# Patient Record
Sex: MALE | Race: BLACK OR AFRICAN AMERICAN | NOT HISPANIC OR LATINO | Employment: OTHER | URBAN - METROPOLITAN AREA
[De-identification: names, ages, dates, MRNs, and addresses within clinical notes are randomized per-mention and may not be internally consistent; named-entity substitution may affect disease eponyms.]

---

## 2020-03-24 ENCOUNTER — OFFICE VISIT (OUTPATIENT)
Dept: URGENT CARE | Facility: CLINIC | Age: 40
End: 2020-03-24
Payer: COMMERCIAL

## 2020-03-24 VITALS
OXYGEN SATURATION: 98 % | HEIGHT: 70 IN | BODY MASS INDEX: 25.91 KG/M2 | WEIGHT: 181 LBS | SYSTOLIC BLOOD PRESSURE: 124 MMHG | DIASTOLIC BLOOD PRESSURE: 74 MMHG | HEART RATE: 70 BPM | TEMPERATURE: 99 F | RESPIRATION RATE: 20 BRPM

## 2020-03-24 DIAGNOSIS — B34.9 ACUTE VIRAL SYNDROME: Primary | ICD-10-CM

## 2020-03-24 PROCEDURE — 99214 PR OFFICE/OUTPT VISIT, EST, LEVL IV, 30-39 MIN: ICD-10-PCS | Mod: S$GLB,,, | Performed by: EMERGENCY MEDICINE

## 2020-03-24 PROCEDURE — 99214 OFFICE O/P EST MOD 30 MIN: CPT | Mod: S$GLB,,, | Performed by: EMERGENCY MEDICINE

## 2020-03-24 RX ORDER — AZITHROMYCIN 250 MG/1
TABLET, FILM COATED ORAL
Status: ON HOLD | COMMUNITY
Start: 2020-03-09 | End: 2020-03-29 | Stop reason: HOSPADM

## 2020-03-24 NOTE — PATIENT INSTRUCTIONS
At this time you do not meet criteria for coronavirus testing.  This does not mean that you dont have coronavirus.  Is simply means that we are unable to test you at this time, and we recommend that you do not have contact with immune compromised persons, pregnant women,  or people in the extremes of age.  Please use good handwashing practices.  At this time you should self-quarantine at home for a minimum of 7 days and an additional 24-48 hours after fever and symptoms have resolved.    Over the Counter Medications for upper respiratory infection and viral syndrome:    1. Ibuprofen 800 mg every 8 hours. May alternate with tylenol 1000 mg every 4 hours. (Do not take tylenol with other sources of acetaminophen such as Dayquil)  2. Zyrtec or Claritin 10 mg daily  3. Delsym or Dayquil for cough and congestion  4. Flonase for congestion and sinus pressure    Please call your primary care doctor or telemedicine if your symptoms worsen.  You may need to speak to your physician regarding whether you require further evaluation in the emergency room.

## 2020-03-24 NOTE — PROGRESS NOTES
"[facility] - Visit Note                                           Chief Complaint  39 y.o. male with URI    History of Present Illness  Zachary Riley Jr. presents to the urgent care with complaints of headaches, back pain, fever.  Patient reports that he took Tylenol 2 tablets about 2 hr ago.  He has no significant past medical history.  He owns a car lot.  Patient denies all risk qualify wrist.  This visit was conducted remotely per [facility] protocol    History reviewed. No pertinent past medical history.  History reviewed. No pertinent surgical history.   Review of patient's allergies indicates:  No Known Allergies     Review of Systems and Physical Exam     Review of Systems  -- Constitution - reports fever, no weight loss, no loss of consciousness  -- Eyes - no changes in vision, no redness, no swelling, no discharge  -- Ear, Nose - no  earache, no loss of hearing, no epistaxis  -- Mouth,Throat - no sore throat, no toothache, normal voice, normal swallowing  -- Respiratory - denies cough and congestion, no shortness of breath, no wheezing, no increased WOB   -- Cardiovascular - denies chest pain, no palpitations, no lower extremity edema  -- Gastrointestinal - denies abdominal pain, denies nausea, vomiting, and diarrhea  -- Genitourinary - no dysuria, denies flank pain, no hematuria or frequency   -- Musculoskeletal - denies back pain, negative for myalgias and arthralgias   -- Neurological - no headache, no neurologic changes, no loss of bladder or bowel function no seizure like activity, no changes in hearing or vision  -- Skin - denies skin changes, no rash, no hives, no suspected skin infection    Vital Signs   height is 5' 10" (1.778 m) and weight is 82.1 kg (181 lb). His temperature is 98.7 °F (37.1 °C). His blood pressure is 124/74 and his pulse is 70. His respiration is 20 and oxygen saturation is 98%.      Physical Exam non complete     Treatment Course, Evaluation, and Medical " Decision Makin.  Physical exam not completed  2.  Patient does not meet criteria for corona virus testing      Diagnosis  -- acute viral syndrome  Disposition and Plan  -- Disposition: home  -- Condition: stable  -- Follow-up: Patient to follow up with Primary Doctor No in 1-2 days, and any specialists noted on discharge paperwork  -- I advised the patient that we have found no life threatening condition today and have provided recommendations his/her care  -- At this time, I believe the patient is clinically stable for discharge.   -- The patient acknowledges that ongoing follow up with a MD is required   -- Patient agrees to comply with all instruction and direction given in the urgent care  -- Patient counseled on strict return precautions as discussed

## 2020-03-27 ENCOUNTER — HOSPITAL ENCOUNTER (OUTPATIENT)
Facility: HOSPITAL | Age: 40
Discharge: HOME | End: 2020-03-29
Attending: EMERGENCY MEDICINE | Admitting: INTERNAL MEDICINE
Payer: COMMERCIAL

## 2020-03-27 DIAGNOSIS — Z20.822 SUSPECTED COVID-19 VIRUS INFECTION: ICD-10-CM

## 2020-03-27 DIAGNOSIS — R50.9 FEVER: ICD-10-CM

## 2020-03-27 DIAGNOSIS — J18.9 PNEUMONIA OF BOTH LOWER LOBES DUE TO INFECTIOUS ORGANISM: ICD-10-CM

## 2020-03-27 DIAGNOSIS — U07.1 COVID-19 VIRUS DETECTED: Primary | ICD-10-CM

## 2020-03-27 LAB
ALBUMIN SERPL BCP-MCNC: 4 G/DL (ref 3.5–5.2)
ALP SERPL-CCNC: 38 U/L (ref 55–135)
ALT SERPL W/O P-5'-P-CCNC: 22 U/L (ref 10–44)
ANION GAP SERPL CALC-SCNC: 14 MMOL/L (ref 8–16)
AST SERPL-CCNC: 29 U/L (ref 10–40)
BASOPHILS # BLD AUTO: 0.01 K/UL (ref 0–0.2)
BASOPHILS NFR BLD: 0.1 % (ref 0–1.9)
BILIRUB SERPL-MCNC: 0.7 MG/DL (ref 0.1–1)
BNP SERPL-MCNC: 19 PG/ML (ref 0–99)
BUN SERPL-MCNC: 16 MG/DL (ref 6–20)
CALCIUM SERPL-MCNC: 8.6 MG/DL (ref 8.7–10.5)
CHLORIDE SERPL-SCNC: 97 MMOL/L (ref 95–110)
CO2 SERPL-SCNC: 26 MMOL/L (ref 23–29)
CREAT SERPL-MCNC: 1.3 MG/DL (ref 0.5–1.4)
CRP SERPL-MCNC: 10.93 MG/DL (ref 0–0.75)
DEPRECATED S PYO AG THROAT QL EIA: NEGATIVE
DIFFERENTIAL METHOD: NORMAL
EOSINOPHIL # BLD AUTO: 0 K/UL (ref 0–0.5)
EOSINOPHIL NFR BLD: 0 % (ref 0–8)
ERYTHROCYTE [DISTWIDTH] IN BLOOD BY AUTOMATED COUNT: 12 % (ref 11.5–14.5)
EST. GFR  (AFRICAN AMERICAN): >60 ML/MIN/1.73 M^2
EST. GFR  (NON AFRICAN AMERICAN): >60 ML/MIN/1.73 M^2
FERRITIN SERPL-MCNC: 516 NG/ML (ref 20–300)
GLUCOSE SERPL-MCNC: 116 MG/DL (ref 70–110)
HCT VFR BLD AUTO: 43.3 % (ref 40–54)
HGB BLD-MCNC: 14.2 G/DL (ref 14–18)
IMM GRANULOCYTES # BLD AUTO: 0.03 K/UL (ref 0–0.04)
IMM GRANULOCYTES NFR BLD AUTO: 0.3 % (ref 0–0.5)
INFLUENZA A, MOLECULAR: NEGATIVE
INFLUENZA B, MOLECULAR: NEGATIVE
LACTATE SERPL-SCNC: 1.5 MMOL/L (ref 0.5–1.9)
LDH SERPL L TO P-CCNC: 233 U/L (ref 110–260)
LYMPHOCYTES # BLD AUTO: 3.2 K/UL (ref 1–4.8)
LYMPHOCYTES NFR BLD: 35.2 % (ref 18–48)
MCH RBC QN AUTO: 29.5 PG (ref 27–31)
MCHC RBC AUTO-ENTMCNC: 32.8 G/DL (ref 32–36)
MCV RBC AUTO: 90 FL (ref 82–98)
MONOCYTES # BLD AUTO: 0.4 K/UL (ref 0.3–1)
MONOCYTES NFR BLD: 4.6 % (ref 4–15)
NEUTROPHILS # BLD AUTO: 5.5 K/UL (ref 1.8–7.7)
NEUTROPHILS NFR BLD: 59.8 % (ref 38–73)
NRBC BLD-RTO: 0 /100 WBC
PLATELET # BLD AUTO: 162 K/UL (ref 150–350)
PMV BLD AUTO: 9.9 FL (ref 9.2–12.9)
POTASSIUM SERPL-SCNC: 3.6 MMOL/L (ref 3.5–5.1)
PROCALCITONIN SERPL IA-MCNC: 0.66 NG/ML (ref 0–0.5)
PROT SERPL-MCNC: 7.7 G/DL (ref 6–8.4)
RBC # BLD AUTO: 4.82 M/UL (ref 4.6–6.2)
SODIUM SERPL-SCNC: 137 MMOL/L (ref 136–145)
SPECIMEN SOURCE: NORMAL
TROPONIN I SERPL DL<=0.01 NG/ML-MCNC: <0.03 NG/ML
WBC # BLD AUTO: 9.18 K/UL (ref 3.9–12.7)

## 2020-03-27 PROCEDURE — 94640 AIRWAY INHALATION TREATMENT: CPT

## 2020-03-27 PROCEDURE — 25000003 PHARM REV CODE 250: Performed by: EMERGENCY MEDICINE

## 2020-03-27 PROCEDURE — 25000242 PHARM REV CODE 250 ALT 637 W/ HCPCS: Performed by: EMERGENCY MEDICINE

## 2020-03-27 PROCEDURE — 99285 EMERGENCY DEPT VISIT HI MDM: CPT | Mod: 25

## 2020-03-27 PROCEDURE — 86140 C-REACTIVE PROTEIN: CPT

## 2020-03-27 PROCEDURE — 82728 ASSAY OF FERRITIN: CPT

## 2020-03-27 PROCEDURE — 87205 SMEAR GRAM STAIN: CPT

## 2020-03-27 PROCEDURE — 25000003 PHARM REV CODE 250: Performed by: INTERNAL MEDICINE

## 2020-03-27 PROCEDURE — 87081 CULTURE SCREEN ONLY: CPT

## 2020-03-27 PROCEDURE — U0002 COVID-19 LAB TEST NON-CDC: HCPCS

## 2020-03-27 PROCEDURE — 63600175 PHARM REV CODE 636 W HCPCS: Performed by: INTERNAL MEDICINE

## 2020-03-27 PROCEDURE — 96376 TX/PRO/DX INJ SAME DRUG ADON: CPT

## 2020-03-27 PROCEDURE — 87880 STREP A ASSAY W/OPTIC: CPT

## 2020-03-27 PROCEDURE — 84145 PROCALCITONIN (PCT): CPT

## 2020-03-27 PROCEDURE — 84484 ASSAY OF TROPONIN QUANT: CPT

## 2020-03-27 PROCEDURE — G0378 HOSPITAL OBSERVATION PER HR: HCPCS

## 2020-03-27 PROCEDURE — 83605 ASSAY OF LACTIC ACID: CPT

## 2020-03-27 PROCEDURE — 87502 INFLUENZA DNA AMP PROBE: CPT

## 2020-03-27 PROCEDURE — 80053 COMPREHEN METABOLIC PANEL: CPT

## 2020-03-27 PROCEDURE — 85025 COMPLETE CBC W/AUTO DIFF WBC: CPT

## 2020-03-27 PROCEDURE — 83880 ASSAY OF NATRIURETIC PEPTIDE: CPT

## 2020-03-27 PROCEDURE — 96375 TX/PRO/DX INJ NEW DRUG ADDON: CPT

## 2020-03-27 PROCEDURE — 87040 BLOOD CULTURE FOR BACTERIA: CPT | Mod: 59

## 2020-03-27 PROCEDURE — 63600175 PHARM REV CODE 636 W HCPCS: Performed by: EMERGENCY MEDICINE

## 2020-03-27 PROCEDURE — 83615 LACTATE (LD) (LDH) ENZYME: CPT

## 2020-03-27 PROCEDURE — 36415 COLL VENOUS BLD VENIPUNCTURE: CPT

## 2020-03-27 PROCEDURE — 87070 CULTURE OTHR SPECIMN AEROBIC: CPT | Mod: 59

## 2020-03-27 PROCEDURE — 94761 N-INVAS EAR/PLS OXIMETRY MLT: CPT

## 2020-03-27 PROCEDURE — 96365 THER/PROPH/DIAG IV INF INIT: CPT

## 2020-03-27 RX ORDER — SODIUM CHLORIDE 0.9 % (FLUSH) 0.9 %
10 SYRINGE (ML) INJECTION
Status: DISCONTINUED | OUTPATIENT
Start: 2020-03-27 | End: 2020-03-29 | Stop reason: HOSPADM

## 2020-03-27 RX ORDER — AZITHROMYCIN 250 MG/1
500 TABLET, FILM COATED ORAL
Status: DISCONTINUED | OUTPATIENT
Start: 2020-03-28 | End: 2020-03-29 | Stop reason: HOSPADM

## 2020-03-27 RX ORDER — ALBUTEROL SULFATE 90 UG/1
2 AEROSOL, METERED RESPIRATORY (INHALATION) EVERY 8 HOURS
Status: DISCONTINUED | OUTPATIENT
Start: 2020-03-27 | End: 2020-03-27

## 2020-03-27 RX ORDER — ACETAMINOPHEN 500 MG
1000 TABLET ORAL
Status: COMPLETED | OUTPATIENT
Start: 2020-03-27 | End: 2020-03-27

## 2020-03-27 RX ORDER — POLYETHYLENE GLYCOL 3350 17 G/17G
17 POWDER, FOR SOLUTION ORAL 2 TIMES DAILY PRN
Status: DISCONTINUED | OUTPATIENT
Start: 2020-03-27 | End: 2020-03-29 | Stop reason: HOSPADM

## 2020-03-27 RX ORDER — ALBUTEROL SULFATE 90 UG/1
2 AEROSOL, METERED RESPIRATORY (INHALATION) EVERY 8 HOURS
Status: DISCONTINUED | OUTPATIENT
Start: 2020-03-28 | End: 2020-03-28

## 2020-03-27 RX ORDER — ONDANSETRON 2 MG/ML
4 INJECTION INTRAMUSCULAR; INTRAVENOUS EVERY 6 HOURS PRN
Status: DISCONTINUED | OUTPATIENT
Start: 2020-03-27 | End: 2020-03-29 | Stop reason: HOSPADM

## 2020-03-27 RX ORDER — IBUPROFEN 200 MG
24 TABLET ORAL
Status: DISCONTINUED | OUTPATIENT
Start: 2020-03-27 | End: 2020-03-29 | Stop reason: HOSPADM

## 2020-03-27 RX ORDER — BENZONATATE 100 MG/1
200 CAPSULE ORAL 3 TIMES DAILY PRN
Status: DISCONTINUED | OUTPATIENT
Start: 2020-03-27 | End: 2020-03-29 | Stop reason: HOSPADM

## 2020-03-27 RX ORDER — GLUCAGON 1 MG
1 KIT INJECTION
Status: DISCONTINUED | OUTPATIENT
Start: 2020-03-27 | End: 2020-03-29 | Stop reason: HOSPADM

## 2020-03-27 RX ORDER — TALC
6 POWDER (GRAM) TOPICAL NIGHTLY PRN
Status: DISCONTINUED | OUTPATIENT
Start: 2020-03-27 | End: 2020-03-29 | Stop reason: HOSPADM

## 2020-03-27 RX ORDER — IBUPROFEN 200 MG
16 TABLET ORAL
Status: DISCONTINUED | OUTPATIENT
Start: 2020-03-27 | End: 2020-03-29 | Stop reason: HOSPADM

## 2020-03-27 RX ORDER — ACETAMINOPHEN 325 MG/1
650 TABLET ORAL EVERY 4 HOURS PRN
Status: DISCONTINUED | OUTPATIENT
Start: 2020-03-27 | End: 2020-03-29 | Stop reason: HOSPADM

## 2020-03-27 RX ADMIN — ALBUTEROL SULFATE 2 PUFF: 90 AEROSOL, METERED RESPIRATORY (INHALATION) at 02:03

## 2020-03-27 RX ADMIN — CEFTRIAXONE SODIUM 1 G: 1 INJECTION, POWDER, FOR SOLUTION INTRAMUSCULAR; INTRAVENOUS at 05:03

## 2020-03-27 RX ADMIN — ACETAMINOPHEN 650 MG: 325 TABLET ORAL at 04:03

## 2020-03-27 RX ADMIN — ACETAMINOPHEN 650 MG: 325 TABLET ORAL at 12:03

## 2020-03-27 RX ADMIN — AZITHROMYCIN MONOHYDRATE 500 MG: 500 INJECTION, POWDER, LYOPHILIZED, FOR SOLUTION INTRAVENOUS at 08:03

## 2020-03-27 RX ADMIN — ACETAMINOPHEN 1000 MG: 500 TABLET, FILM COATED ORAL at 04:03

## 2020-03-27 RX ADMIN — ALBUTEROL SULFATE 2 PUFF: 90 AEROSOL, METERED RESPIRATORY (INHALATION) at 07:03

## 2020-03-27 NOTE — HPI
39 year old female with no significant PMHx who presents to the ED with cough and fever since Monday.  Patient reports symptoms have been persistent, moderately severe.  Cough is non productive.  Fevers have been intermittent.  Fever up to 103 in the ED.  He does report some SOB but not significant.  He reports an exposure to a person with confirmed Covid 19 that lives in his building.  The person was wearing gloves and a mask and they did not share an elevator together and instead Mr. Riley took the elevator up first alone.

## 2020-03-27 NOTE — H&P
[facility]  History & Physical    Patient Name: Zachary Riley Jr.  MRN: [id number]  Admission Date: 3/27/2020  Attending Physician: Adriana Greco MD  Primary Care Provider: Primary Doctor No         Patient information was obtained from patient and ER records.     Subjective:     Principal Problem:<principal problem not specified>    Chief Complaint:   Chief Complaint   Patient presents with    Fever     C/o feeling ill x 8 days--reports weakness, cough (non-productive), fever, aches, Loss of appetite .          HPI: 39 year old female with no significant PMHx who presents to the ED with cough and fever since Monday.  Patient reports symptoms have been persistent, moderately severe.  Cough is non productive.  Fevers have been intermittent.  Fever up to 103 in the ED.  He does report some SOB but not significant.  He reports an exposure to a person with confirmed Covid 19 that lives in his building.  The person was wearing gloves and a mask and they did not share an elevator together and instead Mr. Riley took the elevator up first alone.      History reviewed. No pertinent past medical history.    Past Surgical History:   Procedure Laterality Date    denies         Review of patient's allergies indicates:  No Known Allergies    No current facility-administered medications on file prior to encounter.      Current Outpatient Medications on File Prior to Encounter   Medication Sig    azithromycin (Z-LILIBETH) 250 MG tablet TK 4 TS PO ONCE FOR 1 DOSE     Family History     Problem Relation (Age of Onset)    No Known Problems Mother, Father        Tobacco Use    Smoking status: Never Smoker   Substance and Sexual Activity    Alcohol use: Yes    Drug use: Never    Sexual activity: Yes     Review of Systems   Constitutional: Positive for fever.   HENT: Negative.    Eyes: Negative.    Respiratory: Positive for cough and shortness of breath.    Cardiovascular: Negative.     Gastrointestinal: Negative.    Endocrine: Negative.    Genitourinary: Negative.    Musculoskeletal: Negative.    Skin: Negative.    Allergic/Immunologic: Negative.    Neurological: Positive for weakness.   Hematological: Negative.    Psychiatric/Behavioral: Negative.      Objective:     Vital Signs (Most Recent):  Temp: (!) 100.7 °F (38.2 °C) (03/27/20 0620)  Pulse: 85 (03/27/20 0531)  Resp: (!) 26 (03/27/20 0531)  BP: 105/77 (03/27/20 0531)  SpO2: 95 % (03/27/20 0529) Vital Signs (24h Range):  Temp:  [100.7 °F (38.2 °C)-103 °F (39.4 °C)] 100.7 °F (38.2 °C)  Pulse:  [85-86] 85  Resp:  [25-32] 26  SpO2:  [95 %-97 %] 95 %  BP: (105-135)/(61-77) 105/77     Weight: 78 kg (172 lb)  Body mass index is 24.68 kg/m².    Physical Exam   Constitutional: He is oriented to person, place, and time. He appears well-developed. No distress.   Non toxic appearing but does appear ill   HENT:   Head: Normocephalic and atraumatic.   Mouth/Throat: Oropharynx is clear and moist.   Eyes: Pupils are equal, round, and reactive to light. EOM are normal.   Neck: Normal range of motion.   Cardiovascular: Regular rhythm.   No murmur heard.  Pulmonary/Chest: Effort normal and breath sounds normal. He has no wheezes.   Speaking in full sentences without difficulty    Abdominal: Soft. He exhibits no distension. There is no tenderness. There is no rebound and no guarding.   Musculoskeletal: Normal range of motion.   Neurological: He is alert and oriented to person, place, and time. No cranial nerve deficit or sensory deficit.   Skin: No rash noted.   Psychiatric: He has a normal mood and affect.   Nursing note and vitals reviewed.        CRANIAL NERVES     CN III, IV, VI   Pupils are equal, round, and reactive to light.  Extraocular motions are normal.        Significant Labs:   CBC:   Recent Labs   Lab 03/27/20 0422   WBC 9.18   HGB 14.2   HCT 43.3        CMP:   Recent Labs   Lab 03/27/20  0422      K 3.6   CL 97   CO2 26   *    BUN 16   CREATININE 1.3   CALCIUM 8.6*   PROT 7.7   ALBUMIN 4.0   BILITOT 0.7   ALKPHOS 38*   AST 29   ALT 22   ANIONGAP 14   EGFRNONAA >60.0     Troponin: No results for input(s): TROPONINI in the last 48 hours.    Significant Imaging: CXR: I have reviewed all pertinent results/findings within the past 24 hours and my personal findings are:  increased bibasilar markings on my read with radiology read pending.    Assessment/Plan:     Pneumonia of both lower lobes due to infectious organism  Febrile.  Cough.  CXR with official radiology read pending but does appear to have increased bibasilar markings per my review.  Need to follow up on official read.  IV abx with Azithromycin and Rocephin  Covid 19 screen done in the ED  Albuterol inhaler as needed  O2 per NC as needed  Cultures in progress      Suspected Covid-19 Virus Infection  Given fever, bibasilar markings on CXR, possible exposure- there is concern for Covid 19.   Isolation  Swab done in the ED  Concern that he is getting worse and now with infiltrates on CXR.  Will admit to monitor to determine clinical course.   Inflammatory markers ordered.        VTE Risk Mitigation (From admission, onward)         Ordered     Place PAZ hose  Until discontinued      03/27/20 0554     IP VTE LOW RISK PATIENT  Once      03/27/20 0554                   Adriana Greco MD  Department of Hospital Medicine   [facility]

## 2020-03-27 NOTE — PLAN OF CARE
03/27/20 0756   Patient Assessment/Suction   Level of Consciousness (AVPU) alert   Respiratory Effort Unlabored   Expansion/Accessory Muscles/Retractions no use of accessory muscles   All Lung Fields Breath Sounds diminished   PRE-TX-O2   O2 Device (Oxygen Therapy) room air   SpO2 95 %   Pulse 86   Resp 20   Inhaler   $ Inhaler Charges MDI (Metered Dose Inahler) Treatment;Given With Spacer;Spacer - Equipment   Respiratory Treatment Status (Inhaler) given   Treatment Route (Inhaler) spacer/holding chamber   Patient Position (Inhaler) Alycia's   Post Treatment Assessment (Inhaler) patient reports breathing improved;breath sounds unchanged;vital signs unchanged   Signs of Intolerance (Inhaler) none

## 2020-03-27 NOTE — PROGRESS NOTES
[facility]  Progress Note    Patient Name: Zachary Riley Jr.  MRN: [id number]  Patient Class: OP- Observation   Admission Date: 3/27/2020  Length of Stay: 0 days  Attending Physician: Jewel Queen DO  Primary Care Provider: Primary Doctor No        Subjective:     Principal Problem:<principal problem not specified>        HPI:  39 year old female with no significant PMHx who presents to the ED with cough and fever since Monday.  Patient reports symptoms have been persistent, moderately severe.  Cough is non productive.  Fevers have been intermittent.  Fever up to 103 in the ED.  He does report some SOB but not significant.  He reports an exposure to a person with confirmed Covid 19 that lives in his building.  The person was wearing gloves and a mask and they did not share an elevator together and instead Mr. Riley took the elevator up first alone.      Overview/Hospital Course:  No notes on file    Interval History:  Patient states he feels slightly better has improved appetite  Had shortness of breath but not bad .  No chest pain no abdominal pain no nausea no vomiting.    Objective:     Vital Signs (Most Recent):  Temp: 100.2 °F (37.9 °C) (03/27/20 1618)  Pulse: 74 (03/27/20 1618)  Resp: 18 (03/27/20 1618)  BP: (!) 147/53 (03/27/20 1618)  SpO2: 96 % (03/27/20 1618) Vital Signs (24h Range):  Temp:  [99.3 °F (37.4 °C)-103 °F (39.4 °C)] 100.2 °F (37.9 °C)  Pulse:  [74-87] 74  Resp:  [18-32] 18  SpO2:  [95 %-97 %] 96 %  BP: (105-147)/(53-77) 147/53     Weight: 78 kg (171 lb 15.3 oz)  Body mass index is 24.67 kg/m².    Intake/Output Summary (Last 24 hours) at 3/27/2020 1732  Last data filed at 3/27/2020 0700  Gross per 24 hour   Intake 50 ml   Output --   Net 50 ml      Physical Exam patient appears in no distress lying in bed  HEENT sclerae nonicteric  Neck is supple nontender  Lungs good air movement  Heart regular rate rhythm  Abdomen is nondistended  Extremities no  edema  Neuro patient is alert oriented x3   exam no Holt  Skin no rash  Psych patient is pleasant  Significant Labs:   BMP:   Recent Labs   Lab 03/27/20 0422   *      K 3.6   CL 97   CO2 26   BUN 16   CREATININE 1.3   CALCIUM 8.6*     CBC:   Recent Labs   Lab 03/27/20 0422   WBC 9.18   HGB 14.2   HCT 43.3        CMP:   Recent Labs   Lab 03/27/20 0422      K 3.6   CL 97   CO2 26   *   BUN 16   CREATININE 1.3   CALCIUM 8.6*   PROT 7.7   ALBUMIN 4.0   BILITOT 0.7   ALKPHOS 38*   AST 29   ALT 22   ANIONGAP 14   EGFRNONAA >60.0       Significant Imaging:       Assessment/Plan:    #1 Bilateral basilar infiltrates with underlying suspected COVID-19  Will continue same plan of care as outlined  O2 sats are remaining stable            Jewel Queen DO  Department of Hospital Medicine   [facility]

## 2020-03-27 NOTE — CONSULTS
Consult Note  Infectious Disease    INTERPROFESSIONAL E/M  Remotely reviewed chart, images, notes, labs etc    Suspect COVID 19 pneumonitis   So far oxygenation is good   Fever seems a little better   Fortunately 39 years old, not obese, no significant past medical history    Rec; favor treatment with rocephin/zithromax and HCQ   Follow Qtc and check G6pd    D/w Dr. Queen

## 2020-03-27 NOTE — ED PROVIDER NOTES
Encounter Date: 3/27/2020       History     Chief Complaint   Patient presents with    Fever     C/o feeling ill x 8 days--reports weakness, cough (non-productive), fever, aches, Loss of appetite .       40 yo man with no PMH presents to the ED with fever. The patient complains of generalized malaise and feeling ill for the past week. His symptoms have worsened over the past week. He has a nonproductive cough. He began running fever 3 days ago - tmax at home is 103F. He has diffuse body aches and feels weak. He has not been able to eat food in 3.5 days because he feels so bad. He has been drinking approximately 3 bottles of water per day in addition to orange juice and cranberry juice. 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 He has not been at work since Monday when the governor gave the shelter in place order. One of his neighbors was exposed to someone who tested positive for coronavirus, but the patient has not had close contact.         Review of patient's allergies indicates:  No Known Allergies  No past medical history on file.  No past surgical history on file.  No family history on file.  Social History     Tobacco Use    Smoking status: Never Smoker   Substance Use Topics    Alcohol use: Yes    Drug use: Never     Review of Systems   Constitutional: Positive for chills and fever. Negative for diaphoresis and fatigue.   HENT: Positive for congestion.    Eyes: Negative for visual disturbance.   Respiratory: Positive for cough. Negative for shortness of breath, wheezing and stridor.    Cardiovascular: Negative for chest pain.   Gastrointestinal: Negative for abdominal pain, diarrhea, nausea and vomiting.   Genitourinary: Negative for decreased urine volume, dysuria, flank pain and hematuria.   Musculoskeletal: Negative for back pain.   Skin: Negative for pallor.   Neurological: Positive for weakness. Negative for light-headedness, numbness and headaches.   Psychiatric/Behavioral: Negative for confusion.   All other  systems reviewed and are negative.      Physical Exam     Initial Vitals [03/27/20 0359]   BP Pulse Resp Temp SpO2   135/74 86 (!) 32 (!) 103 °F (39.4 °C) 97 %      MAP       --         Physical Exam    Nursing note and vitals reviewed.  Constitutional: He appears well-developed and well-nourished.   Ill appearing, diaphoretic   HENT:   Head: Normocephalic and atraumatic.   Eyes: EOM are normal. Pupils are equal, round, and reactive to light.   Neck: Normal range of motion. Neck supple.   Cardiovascular: Normal rate, regular rhythm, normal heart sounds and intact distal pulses.   No murmur heard.  Pulmonary/Chest: He is in respiratory distress (tachypnea). He has no wheezes. He has no rhonchi. He has rales (bilateral, scattered).   Abdominal: Soft. He exhibits no distension. There is no tenderness. There is no rebound.   Musculoskeletal: Normal range of motion. He exhibits no edema.   Neurological: He is alert and oriented to person, place, and time. He has normal strength. GCS score is 15. GCS eye subscore is 4. GCS verbal subscore is 5. GCS motor subscore is 6.   Skin: Skin is warm. Capillary refill takes less than 2 seconds.   diaphoretic   Psychiatric: He has a normal mood and affect.         ED Course   Procedures  Labs Reviewed - No data to display       Imaging Results    None       X-Rays:   Independently Interpreted Readings:   Chest X-Ray: Bilateral infiltrates - viral pattern?     Medical Decision Making:   ED Management:  39-year-old male presents to the emergency department with myalgias cough and fever.  On exam he is ill-appearing.  He is tachypneic and only keeping his oxygen sats in the low 90s.  He has a normal white count and normal lactic acid but mildly elevated procalcitonin level.  His chest x-ray reveals bilateral pneumonia.  This could be a viral picture but he was covered with Rocephin and Zithromax in an abundance of caution.  Given the patient's tachypnea, relative hypoxia and overall  clinical appearance I do not think that he would do well if discharged at this time.  He will be admitted for close observation and supportive care.    Sara Junior MD  Emergency Medicine  03/27/2020 6:05 AM                                The patient is positive for COVID.  He has been contacted and advised of his positive result.  All questions answered.    Sara Junior MD  Emergency Medicine  03/30/2020 4:40 PM      Clinical Impression:       ICD-10-CM ICD-9-CM   1. Pneumonia of both lower lobes due to infectious organism J18.1 483.8   2. Fever R50.9 780.60   3. Suspected Covid-19 Virus Infection R68.89                                 Sara Junior MD  03/27/20 0605       Sara Junior MD  03/30/20 1640

## 2020-03-27 NOTE — ASSESSMENT & PLAN NOTE
Given fever, bibasilar markings on CXR, possible exposure- there is concern for Covid 19.   Isolation  Swab done in the ED  Concern that he is getting worse and now with infiltrates on CXR.  Will admit to monitor to determine clinical course.   Inflammatory markers ordered.

## 2020-03-27 NOTE — ASSESSMENT & PLAN NOTE
Febrile.  Cough.  CXR with official radiology read pending but does appear to have increased bibasilar markings per my review.  Need to follow up on official read.  IV abx with Azithromycin and Rocephin  Covid 19 screen done in the ED  Albuterol inhaler as needed  O2 per NC as needed  Cultures in progress

## 2020-03-27 NOTE — SUBJECTIVE & OBJECTIVE
Interval History:  Patient states he feels slightly better has improved appetite  Had shortness of breath but not bad .  No chest pain no abdominal pain no nausea no vomiting.    Objective:     Vital Signs (Most Recent):  Temp: 100.2 °F (37.9 °C) (03/27/20 1618)  Pulse: 74 (03/27/20 1618)  Resp: 18 (03/27/20 1618)  BP: (!) 147/53 (03/27/20 1618)  SpO2: 96 % (03/27/20 1618) Vital Signs (24h Range):  Temp:  [99.3 °F (37.4 °C)-103 °F (39.4 °C)] 100.2 °F (37.9 °C)  Pulse:  [74-87] 74  Resp:  [18-32] 18  SpO2:  [95 %-97 %] 96 %  BP: (105-147)/(53-77) 147/53     Weight: 78 kg (171 lb 15.3 oz)  Body mass index is 24.67 kg/m².    Intake/Output Summary (Last 24 hours) at 3/27/2020 1732  Last data filed at 3/27/2020 0700  Gross per 24 hour   Intake 50 ml   Output --   Net 50 ml      Physical Exam patient appears in no distress lying in bed  HEENT sclerae nonicteric  Neck is supple nontender  Lungs good air movement  Heart regular rate rhythm  Abdomen is nondistended  Extremities no edema  Neuro patient is alert oriented x3   exam no Holt  Skin no rash  Psych patient is pleasant  Significant Labs:   BMP:   Recent Labs   Lab 03/27/20 0422   *      K 3.6   CL 97   CO2 26   BUN 16   CREATININE 1.3   CALCIUM 8.6*     CBC:   Recent Labs   Lab 03/27/20 0422   WBC 9.18   HGB 14.2   HCT 43.3        CMP:   Recent Labs   Lab 03/27/20 0422      K 3.6   CL 97   CO2 26   *   BUN 16   CREATININE 1.3   CALCIUM 8.6*   PROT 7.7   ALBUMIN 4.0   BILITOT 0.7   ALKPHOS 38*   AST 29   ALT 22   ANIONGAP 14   EGFRNONAA >60.0       Significant Imaging:

## 2020-03-27 NOTE — SUBJECTIVE & OBJECTIVE
History reviewed. No pertinent past medical history.    Past Surgical History:   Procedure Laterality Date    denies         Review of patient's allergies indicates:  No Known Allergies    No current facility-administered medications on file prior to encounter.      Current Outpatient Medications on File Prior to Encounter   Medication Sig    azithromycin (Z-LILIBETH) 250 MG tablet TK 4 TS PO ONCE FOR 1 DOSE     Family History     Problem Relation (Age of Onset)    No Known Problems Mother, Father        Tobacco Use    Smoking status: Never Smoker   Substance and Sexual Activity    Alcohol use: Yes    Drug use: Never    Sexual activity: Yes     Review of Systems   Constitutional: Positive for fever.   HENT: Negative.    Eyes: Negative.    Respiratory: Positive for cough and shortness of breath.    Cardiovascular: Negative.    Gastrointestinal: Negative.    Endocrine: Negative.    Genitourinary: Negative.    Musculoskeletal: Negative.    Skin: Negative.    Allergic/Immunologic: Negative.    Neurological: Positive for weakness.   Hematological: Negative.    Psychiatric/Behavioral: Negative.      Objective:     Vital Signs (Most Recent):  Temp: (!) 100.7 °F (38.2 °C) (03/27/20 0620)  Pulse: 85 (03/27/20 0531)  Resp: (!) 26 (03/27/20 0531)  BP: 105/77 (03/27/20 0531)  SpO2: 95 % (03/27/20 0529) Vital Signs (24h Range):  Temp:  [100.7 °F (38.2 °C)-103 °F (39.4 °C)] 100.7 °F (38.2 °C)  Pulse:  [85-86] 85  Resp:  [25-32] 26  SpO2:  [95 %-97 %] 95 %  BP: (105-135)/(61-77) 105/77     Weight: 78 kg (172 lb)  Body mass index is 24.68 kg/m².    Physical Exam   Constitutional: He is oriented to person, place, and time. He appears well-developed. No distress.   Non toxic appearing but does appear ill   HENT:   Head: Normocephalic and atraumatic.   Mouth/Throat: Oropharynx is clear and moist.   Eyes: Pupils are equal, round, and reactive to light. EOM are normal.   Neck: Normal range of motion.   Cardiovascular: Regular rhythm.    No murmur heard.  Pulmonary/Chest: Effort normal and breath sounds normal. He has no wheezes.   Speaking in full sentences without difficulty    Abdominal: Soft. He exhibits no distension. There is no tenderness. There is no rebound and no guarding.   Musculoskeletal: Normal range of motion.   Neurological: He is alert and oriented to person, place, and time. No cranial nerve deficit or sensory deficit.   Skin: No rash noted.   Psychiatric: He has a normal mood and affect.   Nursing note and vitals reviewed.        CRANIAL NERVES     CN III, IV, VI   Pupils are equal, round, and reactive to light.  Extraocular motions are normal.        Significant Labs:   CBC:   Recent Labs   Lab 03/27/20  0422   WBC 9.18   HGB 14.2   HCT 43.3        CMP:   Recent Labs   Lab 03/27/20  0422      K 3.6   CL 97   CO2 26   *   BUN 16   CREATININE 1.3   CALCIUM 8.6*   PROT 7.7   ALBUMIN 4.0   BILITOT 0.7   ALKPHOS 38*   AST 29   ALT 22   ANIONGAP 14   EGFRNONAA >60.0     Troponin: No results for input(s): TROPONINI in the last 48 hours.    Significant Imaging: CXR: I have reviewed all pertinent results/findings within the past 24 hours and my personal findings are:  increased bibasilar markings on my read with radiology read pending.

## 2020-03-28 LAB
ALBUMIN SERPL BCP-MCNC: 3.7 G/DL (ref 3.5–5.2)
ALP SERPL-CCNC: 35 U/L (ref 55–135)
ALT SERPL W/O P-5'-P-CCNC: 23 U/L (ref 10–44)
ANION GAP SERPL CALC-SCNC: 11 MMOL/L (ref 8–16)
AST SERPL-CCNC: 27 U/L (ref 10–40)
BACTERIA #/AREA URNS HPF: NEGATIVE /HPF
BASOPHILS # BLD AUTO: 0.01 K/UL (ref 0–0.2)
BASOPHILS NFR BLD: 0.2 % (ref 0–1.9)
BILIRUB SERPL-MCNC: 0.7 MG/DL (ref 0.1–1)
BILIRUB UR QL STRIP: NEGATIVE
BUN SERPL-MCNC: 16 MG/DL (ref 6–20)
CALCIUM SERPL-MCNC: 8.9 MG/DL (ref 8.7–10.5)
CHLORIDE SERPL-SCNC: 96 MMOL/L (ref 95–110)
CLARITY UR: CLEAR
CO2 SERPL-SCNC: 31 MMOL/L (ref 23–29)
COLOR UR: YELLOW
CREAT SERPL-MCNC: 1.2 MG/DL (ref 0.5–1.4)
DIFFERENTIAL METHOD: NORMAL
EOSINOPHIL # BLD AUTO: 0 K/UL (ref 0–0.5)
EOSINOPHIL NFR BLD: 0 % (ref 0–8)
ERYTHROCYTE [DISTWIDTH] IN BLOOD BY AUTOMATED COUNT: 12.1 % (ref 11.5–14.5)
EST. GFR  (AFRICAN AMERICAN): >60 ML/MIN/1.73 M^2
EST. GFR  (NON AFRICAN AMERICAN): >60 ML/MIN/1.73 M^2
GLUCOSE SERPL-MCNC: 104 MG/DL (ref 70–110)
GLUCOSE UR QL STRIP: NEGATIVE
HCT VFR BLD AUTO: 43.6 % (ref 40–54)
HGB BLD-MCNC: 14.4 G/DL (ref 14–18)
HGB UR QL STRIP: ABNORMAL
HYALINE CASTS #/AREA URNS LPF: 77 /LPF
IMM GRANULOCYTES # BLD AUTO: 0.02 K/UL (ref 0–0.04)
IMM GRANULOCYTES NFR BLD AUTO: 0.4 % (ref 0–0.5)
KETONES UR QL STRIP: ABNORMAL
LEUKOCYTE ESTERASE UR QL STRIP: NEGATIVE
LYMPHOCYTES # BLD AUTO: 1.7 K/UL (ref 1–4.8)
LYMPHOCYTES NFR BLD: 36.5 % (ref 18–48)
MCH RBC QN AUTO: 29.4 PG (ref 27–31)
MCHC RBC AUTO-ENTMCNC: 33 G/DL (ref 32–36)
MCV RBC AUTO: 89 FL (ref 82–98)
MICROSCOPIC COMMENT: ABNORMAL
MONOCYTES # BLD AUTO: 0.3 K/UL (ref 0.3–1)
MONOCYTES NFR BLD: 7.4 % (ref 4–15)
NEUTROPHILS # BLD AUTO: 2.5 K/UL (ref 1.8–7.7)
NEUTROPHILS NFR BLD: 55.5 % (ref 38–73)
NITRITE UR QL STRIP: NEGATIVE
NRBC BLD-RTO: 0 /100 WBC
PH UR STRIP: 7 [PH] (ref 5–8)
PLATELET # BLD AUTO: 178 K/UL (ref 150–350)
PMV BLD AUTO: 9.7 FL (ref 9.2–12.9)
POTASSIUM SERPL-SCNC: 3.8 MMOL/L (ref 3.5–5.1)
PROT SERPL-MCNC: 7.3 G/DL (ref 6–8.4)
PROT UR QL STRIP: ABNORMAL
RBC # BLD AUTO: 4.89 M/UL (ref 4.6–6.2)
RBC #/AREA URNS HPF: 1 /HPF (ref 0–4)
SARS-COV-2 RNA RESP QL NAA+PROBE: DETECTED
SODIUM SERPL-SCNC: 138 MMOL/L (ref 136–145)
SP GR UR STRIP: >1.03 (ref 1–1.03)
SQUAMOUS #/AREA URNS HPF: 17 /HPF
URN SPEC COLLECT METH UR: ABNORMAL
UROBILINOGEN UR STRIP-ACNC: NEGATIVE EU/DL
WBC # BLD AUTO: 4.58 K/UL (ref 3.9–12.7)
WBC #/AREA URNS HPF: 3 /HPF (ref 0–5)

## 2020-03-28 PROCEDURE — G0378 HOSPITAL OBSERVATION PER HR: HCPCS

## 2020-03-28 PROCEDURE — 25000003 PHARM REV CODE 250: Performed by: INTERNAL MEDICINE

## 2020-03-28 PROCEDURE — 96376 TX/PRO/DX INJ SAME DRUG ADON: CPT

## 2020-03-28 PROCEDURE — 63600175 PHARM REV CODE 636 W HCPCS: Performed by: INTERNAL MEDICINE

## 2020-03-28 PROCEDURE — 94761 N-INVAS EAR/PLS OXIMETRY MLT: CPT

## 2020-03-28 PROCEDURE — 99900035 HC TECH TIME PER 15 MIN (STAT)

## 2020-03-28 PROCEDURE — 81001 URINALYSIS AUTO W/SCOPE: CPT

## 2020-03-28 PROCEDURE — 25000003 PHARM REV CODE 250: Performed by: HOSPITALIST

## 2020-03-28 PROCEDURE — 94640 AIRWAY INHALATION TREATMENT: CPT

## 2020-03-28 PROCEDURE — 85025 COMPLETE CBC W/AUTO DIFF WBC: CPT

## 2020-03-28 PROCEDURE — 85041 AUTOMATED RBC COUNT: CPT

## 2020-03-28 PROCEDURE — 80053 COMPREHEN METABOLIC PANEL: CPT

## 2020-03-28 RX ORDER — HYDROXYCHLOROQUINE SULFATE 200 MG/1
400 TABLET, FILM COATED ORAL 2 TIMES DAILY
Status: COMPLETED | OUTPATIENT
Start: 2020-03-28 | End: 2020-03-29

## 2020-03-28 RX ORDER — HYDROXYCHLOROQUINE SULFATE 200 MG/1
400 TABLET, FILM COATED ORAL DAILY
Status: DISCONTINUED | OUTPATIENT
Start: 2020-03-30 | End: 2020-03-29 | Stop reason: HOSPADM

## 2020-03-28 RX ORDER — ALBUTEROL SULFATE 90 UG/1
2 AEROSOL, METERED RESPIRATORY (INHALATION) EVERY 6 HOURS PRN
Status: DISCONTINUED | OUTPATIENT
Start: 2020-03-28 | End: 2020-03-29 | Stop reason: HOSPADM

## 2020-03-28 RX ADMIN — ACETAMINOPHEN 650 MG: 325 TABLET ORAL at 01:03

## 2020-03-28 RX ADMIN — AZITHROMYCIN 500 MG: 250 TABLET, FILM COATED ORAL at 09:03

## 2020-03-28 RX ADMIN — CEFTRIAXONE SODIUM 1 G: 1 INJECTION, POWDER, FOR SOLUTION INTRAMUSCULAR; INTRAVENOUS at 09:03

## 2020-03-28 RX ADMIN — HYDROXYCHLOROQUINE SULFATE 400 MG: 200 TABLET, FILM COATED ORAL at 08:03

## 2020-03-28 RX ADMIN — ALBUTEROL SULFATE 2 PUFF: 90 AEROSOL, METERED RESPIRATORY (INHALATION) at 08:03

## 2020-03-28 NOTE — SUBJECTIVE & OBJECTIVE
Interval History:  Feels better  Less short of breath has not been using O2 this afternoon.  He did have to use it earlier this morning.  Patient denies chest pain abdominal pain nausea vomiting.    Review of Systems  Objective:     Vital Signs (Most Recent):  Temp: 98.2 °F (36.8 °C) (03/28/20 1214)  Pulse: 78 (03/28/20 1214)  Resp: 18 (03/28/20 1214)  BP: 107/70 (03/28/20 1214)  SpO2: 100 % (03/28/20 1214) Vital Signs (24h Range):  Temp:  [97.5 °F (36.4 °C)-102.3 °F (39.1 °C)] 98.2 °F (36.8 °C)  Pulse:  [66-95] 78  Resp:  [18-20] 18  SpO2:  [95 %-100 %] 100 %  BP: (107-162)/(63-86) 107/70     Weight: 78 kg (171 lb 15.3 oz)  Body mass index is 24.67 kg/m².    Intake/Output Summary (Last 24 hours) at 3/28/2020 1708  Last data filed at 3/28/2020 0600  Gross per 24 hour   Intake 240 ml   Output --   Net 240 ml      Physical Exam patient appears improved sitting on the edge of the bed eating  HEENT sclerae nonicteric  Neck is supple nontender  Lungs good air movement  Heart is regular rate rhythm  Abdomen is nondistended  Extremities no edema   exam no Holt  Neuro patient is alert oriented x3  Skin no rash  Psych patient is pleasant cooperative  Significant Labs:   BMP:   Recent Labs   Lab 03/28/20 0527         K 3.8   CL 96   CO2 31*   BUN 16   CREATININE 1.2   CALCIUM 8.9     CBC:   Recent Labs   Lab 03/27/20 0422 03/28/20 0527   WBC 9.18 4.58   HGB 14.2 14.4   HCT 43.3 43.6    178     CMP:   Recent Labs   Lab 03/27/20 0422 03/28/20 0527    138   K 3.6 3.8   CL 97 96   CO2 26 31*   * 104   BUN 16 16   CREATININE 1.3 1.2   CALCIUM 8.6* 8.9   PROT 7.7 7.3   ALBUMIN 4.0 3.7   BILITOT 0.7 0.7   ALKPHOS 38* 35*   AST 29 27   ALT 22 23   ANIONGAP 14 11   EGFRNONAA >60.0 >60.0       Significant Imaging:

## 2020-03-28 NOTE — PLAN OF CARE
03/27/20 2000   PRE-TX-O2   O2 Device (Oxygen Therapy) room air   SpO2 98 %   Pulse 66   Resp 18   Temp 99.8 °F (37.7 °C)   /63

## 2020-03-28 NOTE — PLAN OF CARE
03/28/20 0825   Patient Assessment/Suction   Level of Consciousness (AVPU) alert   Respiratory Effort Normal;Unlabored   Expansion/Accessory Muscles/Retractions expansion symmetric;no retractions;no use of accessory muscles   All Lung Fields Breath Sounds diminished   PRE-TX-O2   O2 Device (Oxygen Therapy) room air   SpO2 96 %   Pulse Oximetry Type Intermittent   $ Pulse Oximetry - Multiple Charge Pulse Oximetry - Multiple   Pulse 82   Resp 18   Inhaler   $ Inhaler Charges MDI (Metered Dose Inahler) Treatment   Daily Review of Necessity (Inhaler) completed   Respiratory Treatment Status (Inhaler) given   Treatment Route (Inhaler) spacer/holding chamber   Patient Position (Inhaler) semi-Tong's   Post Treatment Assessment (Inhaler) breath sounds unchanged   Signs of Intolerance (Inhaler) none

## 2020-03-28 NOTE — PROGRESS NOTES
[facility] Medicine  Progress Note    Patient Name: Zachary Riley Jr.  MRN: [id number]  Patient Class: OP- Observation   Admission Date: 3/27/2020  Length of Stay: 0 days  Attending Physician: Jewel Queen DO  Primary Care Provider: Primary Doctor No        Subjective:     Principal Problem:<principal problem not specified>        HPI:  39 year old female with no significant PMHx who presents to the ED with cough and fever since Monday.  Patient reports symptoms have been persistent, moderately severe.  Cough is non productive.  Fevers have been intermittent.  Fever up to 103 in the ED.  He does report some SOB but not significant.  He reports an exposure to a person with confirmed Covid 19 that lives in his building.  The person was wearing gloves and a mask and they did not share an elevator together and instead Mr. Riley took the elevator up first alone.      Overview/Hospital Course:  No notes on file    Interval History:  Feels better  Less short of breath has not been using O2 this afternoon.  He did have to use it earlier this morning.  Patient denies chest pain abdominal pain nausea vomiting.    Review of Systems  Objective:     Vital Signs (Most Recent):  Temp: 98.2 °F (36.8 °C) (03/28/20 1214)  Pulse: 78 (03/28/20 1214)  Resp: 18 (03/28/20 1214)  BP: 107/70 (03/28/20 1214)  SpO2: 100 % (03/28/20 1214) Vital Signs (24h Range):  Temp:  [97.5 °F (36.4 °C)-102.3 °F (39.1 °C)] 98.2 °F (36.8 °C)  Pulse:  [66-95] 78  Resp:  [18-20] 18  SpO2:  [95 %-100 %] 100 %  BP: (107-162)/(63-86) 107/70     Weight: 78 kg (171 lb 15.3 oz)  Body mass index is 24.67 kg/m².    Intake/Output Summary (Last 24 hours) at 3/28/2020 1708  Last data filed at 3/28/2020 0600  Gross per 24 hour   Intake 240 ml   Output --   Net 240 ml      Physical Exam patient appears improved sitting on the edge of the bed eating  HEENT sclerae nonicteric  Neck is supple nontender  Lungs good air movement  Heart is regular  rate rhythm  Abdomen is nondistended  Extremities no edema   exam no Holt  Neuro patient is alert oriented x3  Skin no rash  Psych patient is pleasant cooperative  Significant Labs:   BMP:   Recent Labs   Lab 03/28/20 0527         K 3.8   CL 96   CO2 31*   BUN 16   CREATININE 1.2   CALCIUM 8.9     CBC:   Recent Labs   Lab 03/27/20 0422 03/28/20 0527   WBC 9.18 4.58   HGB 14.2 14.4   HCT 43.3 43.6    178     CMP:   Recent Labs   Lab 03/27/20 0422 03/28/20 0527    138   K 3.6 3.8   CL 97 96   CO2 26 31*   * 104   BUN 16 16   CREATININE 1.3 1.2   CALCIUM 8.6* 8.9   PROT 7.7 7.3   ALBUMIN 4.0 3.7   BILITOT 0.7 0.7   ALKPHOS 38* 35*   AST 29 27   ALT 22 23   ANIONGAP 14 11   EGFRNONAA >60.0 >60.0       Significant Imaging:       Assessment/Plan:   #1 Bilateral basilar infiltrates - suspected COVID-19  Will continue same plan of care as outlined  O2 sats are remaining stable  Rocephin/Zithromax/and HCQ  G6PD pending   Anticipate discharge in the morning     VTE Risk Mitigation (From admission, onward)         Ordered     Place PAZ hose  Until discontinued      03/27/20 0554     IP VTE LOW RISK PATIENT  Once      03/27/20 0554                      Jewel Queen DO  Department of Hospital Medicine   [facility]

## 2020-03-29 VITALS
TEMPERATURE: 99 F | OXYGEN SATURATION: 99 % | HEART RATE: 64 BPM | BODY MASS INDEX: 24.61 KG/M2 | RESPIRATION RATE: 15 BRPM | WEIGHT: 171.94 LBS | HEIGHT: 70 IN | SYSTOLIC BLOOD PRESSURE: 117 MMHG | DIASTOLIC BLOOD PRESSURE: 72 MMHG

## 2020-03-29 PROBLEM — U07.1 COVID-19 VIRUS DETECTED: Status: ACTIVE | Noted: 2020-03-29

## 2020-03-29 LAB
BACTERIA SPEC AEROBE CULT: NORMAL
BACTERIA THROAT CULT: NORMAL
GRAM STN SPEC: NORMAL

## 2020-03-29 PROCEDURE — 25000003 PHARM REV CODE 250: Performed by: HOSPITALIST

## 2020-03-29 PROCEDURE — 25000003 PHARM REV CODE 250: Performed by: INTERNAL MEDICINE

## 2020-03-29 PROCEDURE — 63600175 PHARM REV CODE 636 W HCPCS: Performed by: INTERNAL MEDICINE

## 2020-03-29 PROCEDURE — 96376 TX/PRO/DX INJ SAME DRUG ADON: CPT

## 2020-03-29 PROCEDURE — G0378 HOSPITAL OBSERVATION PER HR: HCPCS

## 2020-03-29 PROCEDURE — 99900035 HC TECH TIME PER 15 MIN (STAT)

## 2020-03-29 RX ORDER — HYDROXYCHLOROQUINE SULFATE 200 MG/1
400 TABLET, FILM COATED ORAL DAILY
Qty: 4 TABLET | Refills: 0
Start: 2020-03-30 | End: 2020-04-03

## 2020-03-29 RX ADMIN — HYDROXYCHLOROQUINE SULFATE 400 MG: 200 TABLET, FILM COATED ORAL at 09:03

## 2020-03-29 RX ADMIN — CEFTRIAXONE SODIUM 1 G: 1 INJECTION, POWDER, FOR SOLUTION INTRAMUSCULAR; INTRAVENOUS at 09:03

## 2020-03-29 RX ADMIN — AZITHROMYCIN 500 MG: 250 TABLET, FILM COATED ORAL at 09:03

## 2020-03-29 NOTE — PLAN OF CARE
Pt is calm/cooperative, denies any pain, mainly stayed in his bed and watched tv. Pt went to sleep after taking meds. Contacted Dr. Greco about + covid result.

## 2020-03-29 NOTE — HOSPITAL COURSE
Patient is a pleasant 39-year-old male who was admitted with suspected COVID-19.  He was placed on azithromycin and ceftriaxone and hydroxychloroquine.  He did initially require supplemental O2.  Patient did well and now appears stable for discharge.  His O2 sat was approximately 99% on room air at time of discharge.  Nursing called pharmacy to ensure that patient has medication available at time of discharge  Patient has been given instructions for COVID-19 positive patients

## 2020-03-29 NOTE — PLAN OF CARE
03/28/20 2021   PRE-TX-O2   O2 Device (Oxygen Therapy) room air   SpO2 95 %   Pulse 68   Resp 20   Temp 99.1 °F (37.3 °C)   /80

## 2020-03-29 NOTE — PLAN OF CARE
03/29/20 1429   Final Note   Assessment Type Final Discharge Note   Anticipated Discharge Disposition Home     Pt discharged home this date and discharge orders reviewed.  No SS / CM orders noted at this time

## 2020-03-29 NOTE — PLAN OF CARE
03/29/20 0830   Patient Assessment/Suction   Level of Consciousness (AVPU) alert   All Lung Fields Breath Sounds equal bilaterally   PRE-TX-O2   O2 Device (Oxygen Therapy) room air   Oxygen Analyzed Concentration (%) 96 %   Pulse 81   Resp 15   Aerosol Therapy   $ Aerosol Therapy Charges PRN treatment not required

## 2020-03-30 LAB
G6PD BLD QN: 215 U/10E12 RBC (ref 146–376)
RBC # BLD AUTO: 4.76 X10E6/UL (ref 4.14–5.8)

## 2020-04-01 LAB
BACTERIA BLD CULT: NORMAL
BACTERIA BLD CULT: NORMAL

## 2020-04-01 NOTE — PHYSICIAN QUERY
PT Name: Zachary Riley Jr.  MR #: [id number]    Physician Query Form - Cause and Effect Relationship Clarification      CDS/: Jocelyne Mathews Pe               Contact information: [phone]    This form is a permanent document in the medical record.     Query Date: April 1, 2020    By submitting this query, we are merely seeking further clarification of documentation. Please utilize your independent clinical judgment when addressing the question(s) below.    The Medical record contains the following:    The patient was admitted to Out Patient Services - the following diagnosis are noted per Discharge Summary:     COVID-19 virus detected   Pneumonia of both lower lobes due to infectious organism     Provider, please clarify if there is any correlation between  COVID 19 Virus  And  Pneumonia            Are the conditions:  Only COVID-19 with associated infiltrates on chest x-ray      [  ] Due to or associated with each other   [  ] Unrelated to each other   [  ] Other (Please Specify): _________________________   [  ] Clinically Undetermined

## 2020-04-02 NOTE — PHYSICIAN QUERY
PT Name: Zachary Riley Jr.  MR #: [id number]    Physician Query Form - Cause and Effect Relationship Clarification      CDS/: Jocelyne Mathews Pe               Contact information: [phone]    This form is a permanent document in the medical record.     Query Date: April 2, 2020    By submitting this query, we are merely seeking further clarification of documentation. Please utilize your independent clinical judgment when addressing the question(s) below.    The Medical record contains the following:     The patient was admitted to Out Patient Services - the following diagnosis are noted per Discharge Summary:      COVID-19 virus detected   Pneumonia of both lower lobes due to infectious organism      Provider, please clarify if there is any correlation between  COVID 19 Virus  And  Pneumonia       Provider, please clarify if there is any correlation between COVID 19 Virus and Pneumonia.           Are the conditions:  The pneumonia is from SARS CoV2 /COVID 19 , NOT a separate bacterial pneumonia     [  ] Due to or associated with each other   [  ] Unrelated to each other   [  ] Other (Please Specify): _________________________   [  ] Clinically Undetermined

## 2020-04-08 ENCOUNTER — PATIENT OUTREACH (OUTPATIENT)
Dept: FAMILY MEDICINE | Facility: CLINIC | Age: 40
End: 2020-04-08

## 2020-07-13 ENCOUNTER — HOSPITAL ENCOUNTER (EMERGENCY)
Facility: HOSPITAL | Age: 40
Discharge: HOME | End: 2020-07-13
Attending: EMERGENCY MEDICINE
Payer: COMMERCIAL

## 2020-07-13 VITALS
DIASTOLIC BLOOD PRESSURE: 63 MMHG | SYSTOLIC BLOOD PRESSURE: 137 MMHG | WEIGHT: 180 LBS | OXYGEN SATURATION: 99 % | HEIGHT: 70 IN | RESPIRATION RATE: 16 BRPM | BODY MASS INDEX: 25.77 KG/M2 | TEMPERATURE: 98 F | HEART RATE: 69 BPM

## 2020-07-13 DIAGNOSIS — A60.01 HERPES SIMPLEX INFECTION OF PENIS: Primary | ICD-10-CM

## 2020-07-13 PROCEDURE — 99281 EMR DPT VST MAYX REQ PHY/QHP: CPT

## 2020-07-13 RX ORDER — ACYCLOVIR 800 MG/1
800 TABLET ORAL
Qty: 40 TABLET | Refills: 0 | Status: SHIPPED | OUTPATIENT
Start: 2020-07-13 | End: 2021-07-13

## 2020-07-13 NOTE — ED PROVIDER NOTES
Encounter Date: 7/13/2020       History   No chief complaint on file.    Presents with symptoms of genital herpes.  Pt reports he has had in the past but not in many years.  Reports blisters to penis Onset 2 days        Review of patient's allergies indicates:  No Known Allergies  No past medical history on file.  Past Surgical History:   Procedure Laterality Date    denies       Family History   Problem Relation Age of Onset    No Known Problems Mother     No Known Problems Father      Social History     Tobacco Use    Smoking status: Never Smoker   Substance Use Topics    Alcohol use: Yes    Drug use: Never     Review of Systems   Constitutional: Negative for fever.   Respiratory: Negative for cough, shortness of breath and wheezing.    Cardiovascular: Negative for chest pain, palpitations and leg swelling.   Gastrointestinal: Negative for abdominal pain, diarrhea, nausea and vomiting.   Genitourinary: Positive for genital sores. Negative for difficulty urinating, discharge, penile pain, penile swelling, scrotal swelling and testicular pain.   Musculoskeletal: Negative for back pain.   Skin: Negative for rash.   Neurological: Negative for weakness.       Physical Exam     Initial Vitals   BP Pulse Resp Temp SpO2   -- -- -- -- --      MAP       --         Physical Exam    Constitutional: He appears well-developed and well-nourished.   HENT:   Mouth/Throat: Oropharynx is clear and moist.   Eyes: Conjunctivae are normal.   Neck: Normal range of motion. Neck supple.   Cardiovascular: Normal rate.   Pulmonary/Chest: Breath sounds normal. No respiratory distress. He has no wheezes. He has no rhonchi.   Genitourinary:    Genitourinary Comments: Blisters to shaft of penis Neg for swelling     Musculoskeletal: Normal range of motion.   Neurological: He is alert and oriented to person, place, and time.   Skin: Skin is warm. Capillary refill takes less than 2 seconds.   Psychiatric: He has a normal mood and affect.          ED Course   Procedures  Labs Reviewed - No data to display       Imaging Results    None          Medical Decision Making:   Initial Assessment:   Genital blisters    Have discussed this pt with Dr Hatch                   ED Course as of Jul 13 1203   Mon Jul 13, 2020   1158 Needs meds for genital herpes      [KN]      ED Course User Index  [KN] Vijaya Phipps NP                Clinical Impression:   1. Genital herpes                             Vijaya Phipps NP  07/13/20 1212

## 2021-09-15 ENCOUNTER — HOSPITAL ENCOUNTER (EMERGENCY)
Facility: HOSPITAL | Age: 41
Discharge: HOME | End: 2021-09-15
Attending: EMERGENCY MEDICINE
Payer: COMMERCIAL

## 2021-09-15 VITALS
DIASTOLIC BLOOD PRESSURE: 84 MMHG | BODY MASS INDEX: 26.05 KG/M2 | HEIGHT: 70 IN | WEIGHT: 182 LBS | HEART RATE: 51 BPM | RESPIRATION RATE: 20 BRPM | SYSTOLIC BLOOD PRESSURE: 120 MMHG | TEMPERATURE: 98 F | OXYGEN SATURATION: 98 %

## 2021-09-15 DIAGNOSIS — K12.2 UVULITIS: Primary | ICD-10-CM

## 2021-09-15 PROCEDURE — 99282 EMERGENCY DEPT VISIT SF MDM: CPT

## 2021-09-15 RX ORDER — AMOXICILLIN AND CLAVULANATE POTASSIUM 875; 125 MG/1; MG/1
1 TABLET, FILM COATED ORAL 2 TIMES DAILY
Qty: 14 TABLET | Refills: 0 | Status: SHIPPED | OUTPATIENT
Start: 2021-09-15 | End: 2023-01-26

## 2021-09-15 RX ORDER — PREDNISONE 20 MG/1
40 TABLET ORAL DAILY
Qty: 10 TABLET | Refills: 0 | Status: SHIPPED | OUTPATIENT
Start: 2021-09-15 | End: 2021-09-15 | Stop reason: SDUPTHER

## 2021-09-15 RX ORDER — PREDNISONE 20 MG/1
40 TABLET ORAL DAILY
Qty: 10 TABLET | Refills: 0 | Status: SHIPPED | OUTPATIENT
Start: 2021-09-15 | End: 2021-09-20

## 2021-09-15 RX ORDER — AMOXICILLIN AND CLAVULANATE POTASSIUM 875; 125 MG/1; MG/1
1 TABLET, FILM COATED ORAL 2 TIMES DAILY
Qty: 14 TABLET | Refills: 0 | Status: SHIPPED | OUTPATIENT
Start: 2021-09-15 | End: 2021-09-15 | Stop reason: SDUPTHER

## 2021-10-12 ENCOUNTER — HOSPITAL ENCOUNTER (EMERGENCY)
Facility: HOSPITAL | Age: 41
Discharge: HOME | End: 2021-10-12
Attending: EMERGENCY MEDICINE
Payer: COMMERCIAL

## 2021-10-12 VITALS
DIASTOLIC BLOOD PRESSURE: 82 MMHG | SYSTOLIC BLOOD PRESSURE: 136 MMHG | RESPIRATION RATE: 17 BRPM | TEMPERATURE: 99 F | WEIGHT: 182 LBS | HEART RATE: 53 BPM | HEIGHT: 70 IN | OXYGEN SATURATION: 99 % | BODY MASS INDEX: 26.05 KG/M2

## 2021-10-12 DIAGNOSIS — A60.01 HERPES SIMPLEX INFECTION OF PENIS: Primary | ICD-10-CM

## 2021-10-12 PROCEDURE — 99282 EMERGENCY DEPT VISIT SF MDM: CPT

## 2021-10-12 RX ORDER — ACYCLOVIR 800 MG/1
800 TABLET ORAL 3 TIMES DAILY
Qty: 15 TABLET | Refills: 3 | Status: SHIPPED | OUTPATIENT
Start: 2021-10-12 | End: 2021-10-17

## 2022-05-05 ENCOUNTER — HOSPITAL ENCOUNTER (EMERGENCY)
Facility: OTHER | Age: 42
Discharge: HOME | End: 2022-05-05
Attending: EMERGENCY MEDICINE

## 2022-05-05 VITALS
OXYGEN SATURATION: 96 % | TEMPERATURE: 99 F | SYSTOLIC BLOOD PRESSURE: 130 MMHG | RESPIRATION RATE: 19 BRPM | DIASTOLIC BLOOD PRESSURE: 75 MMHG | HEART RATE: 106 BPM

## 2022-05-05 DIAGNOSIS — H66.91 RIGHT OTITIS MEDIA, UNSPECIFIED OTITIS MEDIA TYPE: Primary | ICD-10-CM

## 2022-05-05 PROCEDURE — 99284 EMERGENCY DEPT VISIT MOD MDM: CPT

## 2022-05-05 RX ORDER — AMOXICILLIN 500 MG/1
500 CAPSULE ORAL 3 TIMES DAILY
Qty: 30 CAPSULE | Refills: 0 | Status: SHIPPED | OUTPATIENT
Start: 2022-05-05 | End: 2022-05-15

## 2022-05-05 RX ORDER — CETIRIZINE HYDROCHLORIDE 10 MG/1
10 TABLET ORAL DAILY
Qty: 30 TABLET | Refills: 0 | Status: SHIPPED | OUTPATIENT
Start: 2022-05-05 | End: 2023-01-26

## 2022-05-05 RX ORDER — FLUTICASONE PROPIONATE 50 MCG
1 SPRAY, SUSPENSION (ML) NASAL 2 TIMES DAILY PRN
Qty: 15 G | Refills: 0 | Status: SHIPPED | OUTPATIENT
Start: 2022-05-05 | End: 2023-01-26

## 2022-05-06 NOTE — ED TRIAGE NOTES
Pt presents to the ED c/o otalgia. Pt reports feeling fullness and pain in the right ear for the past day. Denies any other complaints at this time. AAOx4

## 2022-05-06 NOTE — ED PROVIDER NOTES
Source of History:  The patient    Chief complaint:  Otalgia      HPI:  Zachary Riley Jr. is a 41 y.o. male presenting with gradual onset of right ear pain that started about 6 days ago.  Just prior to this patient had been dealing with some nasal congestion.  He flew to [place] on an airplane last Friday and stated his ear became very tight and never decompressed after he landed.  It stayed this way through his trip and even when he flew home.  He is trait by cell the maneuvers with no improvement.  He has taken 3 days of decongestant with no improvement.  The ear is now painful with decreased hearing.  Denies any fevers.    This is the extent to the patients complaints today here in the emergency department.    ROS: As per HPI and below:  Constitutional: No fever.  No chills.  Eyes: No visual changes.  ENT:  Positive for right ear pain   Cardiovascular: No chest pain.  Respiratory: No shortness of breath.  GI: No abdominal pain.  No nausea or vomiting.  Genitourinary: No abnormal urination.  Neurologic: No headache. No focal weakness.  No numbness.  MSK: no back pain.  Integument: No rashes or lesions.  Hematologic: No easy bruising.  Endocrine: No excessive thirst or urination.    Review of patient's allergies indicates:  No Known Allergies    PMH:  As per HPI and below:  No past medical history on file.  Past Surgical History:   Procedure Laterality Date    denies         Social History     Tobacco Use    Smoking status: Never Smoker   Substance Use Topics    Alcohol use: Yes    Drug use: Never       Physical Exam:    /75 (BP Location: Left arm, Patient Position: Lying)   Pulse 106   Temp 99.1 °F (37.3 °C) (Oral)   Resp 19   SpO2 96%   Nursing note and vital signs reviewed.  Constitutional: No acute distress.  Nontoxic  Eyes: No conjunctival injection.  Extraocular muscles are intact.  ENT:  Right tympanic membrane is erythematous, bulging pop, dull appearing with fluid behind the ear and  some exudate.  There is no debris in the external canal.  Left ear is normal including a normal tympanic membrane.  Musculoskeletal: Good range of motion all joints.  No deformities.  Neck supple.  No meningismus.  Skin: No rashes seen.  Good turgor.  No abrasions.  No ecchymoses.  Neuro: alert and oriented x3,  no focal neurological deficits.  Psych: Appropriate, conversant    Labs that have been ordered have been independently reviewed and interpreted by myself.    I decided to obtain the patient's medical records.  Summary of Medical Records:      MDM/ Differential Dx:  41 y.o. male with right otitis media.  Will treat with antibiotics and decongestants.    ED Course as of 05/05/22 2043   Thu May 05, 2022   2041 Patient discharged home with care instructions from [facility] for ear infections.    Patient to be discharged home in stable condition. Diagnosis and treatment plan explained to patient and/or family member who is at bedside. I have answered all questions and the patient is satisfied with the plan of care. Strict return precautions given. The patient demonstrates understanding of the care plan. [SM]      ED Course User Index  [SM] Lobito Larsen DO               Diagnostic Impression:    1. Right otitis media, unspecified otitis media type         ED Disposition Condition    Discharge Stable          ED Prescriptions     Medication Sig Dispense Start Date End Date Auth. Provider    amoxicillin (AMOXIL) 500 MG capsule Take 1 capsule (500 mg total) by mouth 3 (three) times daily. for 10 days 30 capsule 5/5/2022 5/15/2022 Lobito Larsen DO    cetirizine (ZYRTEC) 10 MG tablet Take 1 tablet (10 mg total) by mouth once daily. 30 tablet 5/5/2022 5/5/2023 Lobito Larsen, DO    fluticasone propionate (FLONASE) 50 mcg/actuation nasal spray 1 spray (50 mcg total) by Each Nostril route 2 (two) times daily as needed (congestion). 15 g 5/5/2022  Lobito Laresn DO        Follow-up  Information    None          Lobito Larsen,   05/05/22 2049

## 2022-06-01 ENCOUNTER — HOSPITAL ENCOUNTER (EMERGENCY)
Facility: HOSPITAL | Age: 42
Discharge: HOME | End: 2022-06-01
Attending: EMERGENCY MEDICINE
Payer: COMMERCIAL

## 2022-06-01 VITALS
TEMPERATURE: 98 F | WEIGHT: 183.19 LBS | SYSTOLIC BLOOD PRESSURE: 118 MMHG | RESPIRATION RATE: 16 BRPM | BODY MASS INDEX: 26.22 KG/M2 | HEART RATE: 64 BPM | OXYGEN SATURATION: 100 % | HEIGHT: 70 IN | DIASTOLIC BLOOD PRESSURE: 82 MMHG

## 2022-06-01 DIAGNOSIS — R21 RASH AND NONSPECIFIC SKIN ERUPTION: Primary | ICD-10-CM

## 2022-06-01 PROCEDURE — 25000003 PHARM REV CODE 250: Performed by: EMERGENCY MEDICINE

## 2022-06-01 PROCEDURE — 96372 THER/PROPH/DIAG INJ SC/IM: CPT | Performed by: EMERGENCY MEDICINE

## 2022-06-01 PROCEDURE — 99284 EMERGENCY DEPT VISIT MOD MDM: CPT

## 2022-06-01 PROCEDURE — 63600175 PHARM REV CODE 636 W HCPCS: Performed by: EMERGENCY MEDICINE

## 2022-06-01 RX ORDER — PREDNISONE 20 MG/1
20 TABLET ORAL DAILY
Qty: 5 TABLET | Refills: 0 | Status: SHIPPED | OUTPATIENT
Start: 2022-06-01 | End: 2022-06-06

## 2022-06-01 RX ORDER — FAMOTIDINE 20 MG/1
20 TABLET, FILM COATED ORAL
Status: COMPLETED | OUTPATIENT
Start: 2022-06-01 | End: 2022-06-01

## 2022-06-01 RX ORDER — DIPHENHYDRAMINE HCL 25 MG
25 CAPSULE ORAL
Status: COMPLETED | OUTPATIENT
Start: 2022-06-01 | End: 2022-06-01

## 2022-06-01 RX ORDER — METHYLPREDNISOLONE SOD SUCC 125 MG
125 VIAL (EA) INJECTION
Status: COMPLETED | OUTPATIENT
Start: 2022-06-01 | End: 2022-06-01

## 2022-06-01 RX ADMIN — DIPHENHYDRAMINE HYDROCHLORIDE 25 MG: 25 CAPSULE ORAL at 09:06

## 2022-06-01 RX ADMIN — METHYLPREDNISOLONE SODIUM SUCCINATE 125 MG: 125 INJECTION, POWDER, FOR SOLUTION INTRAMUSCULAR; INTRAVENOUS at 09:06

## 2022-06-01 RX ADMIN — FAMOTIDINE 20 MG: 20 TABLET ORAL at 09:06

## 2022-06-01 NOTE — ED PROVIDER NOTES
Encounter Date: 6/1/2022       History     Chief Complaint   Patient presents with    Rash     Rash to bilateral arms, upper trunk and head since sunday     41 year old male presents to the ED complaining of a pruritic rash x 3 days. Patient reports he woke up and his arm was itching. When he looked in the mirror he noticed the rash on both his arms, back, and back of head. The patient has tried Benadryl and Claritin without relief. Patient notes the day prior to rash onset he sprayed his couch and bed with fabreeze which he has never used before. He also was in a hottub at a country club that day. Recent travel outside the country 2-3 weeks ago. No blisters. No known allergies. No new medications. He has not experienced this before. Denies fevers, fatigue, shortness of breath, sore throat.        Review of patient's allergies indicates:  No Known Allergies  No past medical history on file.  Past Surgical History:   Procedure Laterality Date    denies       Family History   Problem Relation Age of Onset    No Known Problems Mother     No Known Problems Father      Social History     Tobacco Use    Smoking status: Never Smoker   Substance Use Topics    Alcohol use: Yes    Drug use: Never     Review of Systems   Constitutional: Negative for chills, fever and unexpected weight change.   HENT: Negative for facial swelling, sore throat and trouble swallowing.    Respiratory: Negative for cough and shortness of breath.    Cardiovascular: Negative for chest pain.   Gastrointestinal: Negative for abdominal pain, nausea and vomiting.   Genitourinary: Negative for dysuria.   Musculoskeletal: Negative for arthralgias.   Skin: Positive for rash.       Physical Exam     Initial Vitals [06/01/22 0633]   BP Pulse Resp Temp SpO2   (!) 121/90 (!) 58 16 97.7 °F (36.5 °C) 100 %      MAP       --         Physical Exam    Nursing note and vitals reviewed.  Constitutional: He appears well-developed and well-nourished.   HENT:    Head: Normocephalic.   Mouth/Throat: No oropharyngeal exudate.   Uvula is midline   Eyes: EOM are normal. Pupils are equal, round, and reactive to light.   Neck: Neck supple.   Normal range of motion.  Cardiovascular: Normal rate, regular rhythm and normal heart sounds.   Pulmonary/Chest: Breath sounds normal. No respiratory distress. He exhibits no tenderness.   Abdominal: Abdomen is soft. Bowel sounds are normal. He exhibits no distension. There is no abdominal tenderness.   Musculoskeletal:         General: Normal range of motion.      Cervical back: Normal range of motion and neck supple.     Neurological: He is alert and oriented to person, place, and time. He has normal strength.   Skin: Rash (BUE, upper trunk, posterior head) noted. Rash is maculopapular.   Rash does nicho with pressure it is raised, and itchy occult according to the patient   Psychiatric: He has a normal mood and affect.         ED Course   Procedures  Labs Reviewed - No data to display       Imaging Results    None          Medications   methylPREDNISolone sodium succinate injection 125 mg (125 mg Intramuscular Given 6/1/22 0948)   diphenhydrAMINE capsule 25 mg (25 mg Oral Given 6/1/22 0948)   famotidine tablet 20 mg (20 mg Oral Given 6/1/22 0948)     Medical Decision Making:   Initial Assessment:   41 year old male presents to the ED complaining of a pruritic rash x 3 days. Patient reports he woke up and his arm was itching. When he looked in the mirror he noticed the rash on both his arms, back, and back of head. The patient has tried Benadryl and Claritin without relief. Patient notes the day prior to rash onset he sprayed his couch and bed with fabreeze which he has never used before. He also was in a hottub at a country club that day. Recent travel outside the country 2-3 weeks ago. No blisters. No known allergies. No new medications. He has not experienced this before. Denies fevers, fatigue, shortness of breath, sore throat.    Differential Diagnosis:   Contact dermatitis, folliculitis, urticaria, viral exanthem  ED Management:  41-year-old male presents to the emergency department with a pruritic rash for the last 3 days rash does nicho with pressure patient is nontoxic appearing is no respiratory difficulty patient was given , Solu-Medrol, Pepcid, and Benadryl emergency department with mild relief of symptoms the rash does appear that it is dissipating.  Patient will be discharged home referred to Dermatology for further outpatient testing and definitive care he was given detailed return precautions.            Attending Attestation:     Physician Attestation Statement for NP/PA:       Other NP/PA Attestation Additions:    History of Present Illness: I was not called upon to see this patient but was available for consultation                        Clinical Impression:   Final diagnoses:  [R21] Rash and nonspecific skin eruption (Primary)          ED Disposition Condition    Discharge Stable        ED Prescriptions     Medication Sig Dispense Start Date End Date Auth. Provider    predniSONE (DELTASONE) 20 MG tablet Take 1 tablet (20 mg total) by mouth once daily. for 5 days 5 tablet 6/1/2022 6/6/2022 DAILY Randall        Follow-up Information     Follow up With Specialties Details Why Contact Info    Manoj Johnson MD Dermatology Schedule an appointment as soon as possible for a visit in 2 days  [address]  [address]  [place] [address]  [phone]             DAILY Randall  06/01/22 1049       Napoleon Alberto MD  06/01/22 0260

## 2022-06-01 NOTE — DISCHARGE INSTRUCTIONS
Continue Benadryl over-the-counter as directed  Pepcid AC over-the-counter cause directed  Steroids for the next 5 days  Please follow-up with a dermatologist as directed for further evaluation definitive care  Return if condition becomes worse or for any concerns

## 2022-10-14 ENCOUNTER — OFFICE VISIT (OUTPATIENT)
Dept: HEMATOLOGY/ONCOLOGY | Facility: CLINIC | Age: 42
End: 2022-10-14
Payer: COMMERCIAL

## 2022-10-14 VITALS
DIASTOLIC BLOOD PRESSURE: 80 MMHG | RESPIRATION RATE: 18 BRPM | SYSTOLIC BLOOD PRESSURE: 135 MMHG | TEMPERATURE: 97 F | HEIGHT: 70 IN | HEART RATE: 82 BPM | BODY MASS INDEX: 26.24 KG/M2 | WEIGHT: 183.31 LBS

## 2022-10-14 DIAGNOSIS — C83.00 SMALL LYMPHOCYTIC LYMPHOMA: Primary | ICD-10-CM

## 2022-10-14 PROCEDURE — 99204 OFFICE O/P NEW MOD 45 MIN: CPT | Mod: S$GLB,,, | Performed by: INTERNAL MEDICINE

## 2022-10-14 PROCEDURE — 99204 PR OFFICE/OUTPT VISIT, NEW, LEVL IV, 45-59 MIN: ICD-10-PCS | Mod: S$GLB,,, | Performed by: INTERNAL MEDICINE

## 2022-10-14 PROCEDURE — 3075F SYST BP GE 130 - 139MM HG: CPT | Mod: CPTII,S$GLB,, | Performed by: INTERNAL MEDICINE

## 2022-10-14 PROCEDURE — 3075F PR MOST RECENT SYSTOLIC BLOOD PRESS GE 130-139MM HG: ICD-10-PCS | Mod: CPTII,S$GLB,, | Performed by: INTERNAL MEDICINE

## 2022-10-14 PROCEDURE — 3079F PR MOST RECENT DIASTOLIC BLOOD PRESSURE 80-89 MM HG: ICD-10-PCS | Mod: CPTII,S$GLB,, | Performed by: INTERNAL MEDICINE

## 2022-10-14 PROCEDURE — 1159F MED LIST DOCD IN RCRD: CPT | Mod: CPTII,S$GLB,, | Performed by: INTERNAL MEDICINE

## 2022-10-14 PROCEDURE — 1159F PR MEDICATION LIST DOCUMENTED IN MEDICAL RECORD: ICD-10-PCS | Mod: CPTII,S$GLB,, | Performed by: INTERNAL MEDICINE

## 2022-10-14 PROCEDURE — 3079F DIAST BP 80-89 MM HG: CPT | Mod: CPTII,S$GLB,, | Performed by: INTERNAL MEDICINE

## 2022-10-31 NOTE — PROGRESS NOTES
[facility] Hematology Oncology In Office Initial Encounter Note    10/14/22    Subjective:      Patient ID:   Zachary Riley Jr.  42 y.o. male  1980  Dr. Solo      Chief Complaint:   NHL    HPI:  42 y.o. male with Small lymphocytic lymphoma diagnosed in 2019.  No B sx, he has hx of L & R neck lymphadenopathy,  CBC NL.  He has been followed with observation.  Appetite fine  Weight 183 to 187#.  Hx of fever and night sweats 3/2019, not presently.    He has abdominal pain after eating, cramping.  BM semi formed.    No surgery.    Smoke no, ETOH socially, Allergy no. 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    Covid 4/2020, [facility] bilateral pneumonia.    No meds.    Mom, Dad, Br, Sist, 2 daugh and 1 son all A & W    ROS:   GEN: normal without any fever, night sweats or weight loss  HEENT: See HPi  CV: normal with no CP, SOB, PND, CLAYTON or orthopnea  PULM: normal with no SOB, cough, hemoptysis, sputum or pleuritic pain  GI: See HPI  : normal with no hematuria, dysuria  BREAST: normal with no mass, discharge, pain  SKIN: normal with no rash, erythema, bruising, or swelling     No past medical history on file.  Past Surgical History:   Procedure Laterality Date    denies         Review of patient's allergies indicates:  No Known Allergies  Social History     Socioeconomic History    Marital status: Single   Tobacco Use    Smoking status: Never    Smokeless tobacco: Never   Substance and Sexual Activity    Alcohol use: Yes    Drug use: Never    Sexual activity: Yes         Current Outpatient Medications:     amoxicillin-clavulanate 875-125mg (AUGMENTIN) 875-125 mg per tablet, Take 1 tablet by mouth 2 (two) times daily., Disp: 14 tablet, Rfl: 0    cetirizine (ZYRTEC) 10 MG tablet, Take 1 tablet (10 mg total) by mouth once daily., Disp: 30 tablet, Rfl: 0    fluticasone propionate (FLONASE) 50 mcg/actuation nasal spray, 1 spray (50 mcg total) by Each Nostril route 2 (two) times daily as needed (congestion)., Disp: 15 g, Rfl: 0     "acyclovir (ZOVIRAX) 800 MG Tab, Take 1 tablet (800 mg total) by mouth 3 (three) times daily. for 5 days, Disp: 15 tablet, Rfl: 3          Objective:   Vitals:  Blood pressure 135/80, pulse 82, temperature 97.1 °F (36.2 °C), resp. rate 18, height 5' 10" (1.778 m), weight 83.1 kg (183 lb 4.8 oz).    Physical Examination:   GEN: no apparent distress, comfortable  HEAD: atraumatic and normocephalic  EYES: no pallor, no icterus  ENT:  no pharyngeal erythema, external ears WNL; no nasal discharge  NECK: no masses, thyroid normal, trachea midline, no LAD/LN's, supple  CV: RRR with no murmur; normal pulse; normal S1 and S2; no pedal edema  CHEST: Normal respiratory effort; CTAB; normal breath sounds; no wheeze or crackles  ABDOM: nontender and nondistended; soft; no rebound/guarding, L/S NP  MUSC/Skeletal: ROM normal; no crepitus; joints normal  EXTREM: no clubbing, cyanosis, inflammation or swelling  SKIN: no rashes, lesions, ulcers, petechiae   : no cvat  NEURO: grossly intact; motor/sensory WNL; no tremors  PSYCH: normal mood, affect and behavior  LYMPH: normal supraclavicular, axillary and groin LN's  Shotty LN palpable at L and R neck.      Labs:   Lab Results   Component Value Date    WBC 4.58 03/28/2020    HGB 14.4 03/28/2020    HCT 43.6 03/28/2020    MCV 89 03/28/2020     03/28/2020    CMP  Sodium   Date Value Ref Range Status   03/28/2020 138 136 - 145 mmol/L Final     Potassium   Date Value Ref Range Status   03/28/2020 3.8 3.5 - 5.1 mmol/L Final     Chloride   Date Value Ref Range Status   03/28/2020 96 95 - 110 mmol/L Final     CO2   Date Value Ref Range Status   03/28/2020 31 (H) 23 - 29 mmol/L Final     Glucose   Date Value Ref Range Status   03/28/2020 104 70 - 110 mg/dL Final     BUN   Date Value Ref Range Status   03/28/2020 16 6 - 20 mg/dL Final     Creatinine   Date Value Ref Range Status   03/28/2020 1.2 0.5 - 1.4 mg/dL Final     Calcium   Date Value Ref Range Status   03/28/2020 8.9 8.7 - 10.5 " mg/dL Final     Total Protein   Date Value Ref Range Status   03/28/2020 7.3 6.0 - 8.4 g/dL Final     Albumin   Date Value Ref Range Status   03/28/2020 3.7 3.5 - 5.2 g/dL Final     Total Bilirubin   Date Value Ref Range Status   03/28/2020 0.7 0.1 - 1.0 mg/dL Final     Comment:     For infants and newborns, interpretation of results should be based  on gestational age, weight and in agreement with clinical  observations.  Premature Infant recommended reference ranges:  Up to 24 hours.............<8.0 mg/dL  Up to 48 hours............<12.0 mg/dL  3-5 days..................<15.0 mg/dL  6-29 days.................<15.0 mg/dL       Alkaline Phosphatase   Date Value Ref Range Status   03/28/2020 35 (L) 55 - 135 U/L Final     AST   Date Value Ref Range Status   03/28/2020 27 10 - 40 U/L Final     ALT   Date Value Ref Range Status   03/28/2020 23 10 - 44 U/L Final     Anion Gap   Date Value Ref Range Status   03/28/2020 11 8 - 16 mmol/L Final     eGFR if    Date Value Ref Range Status   03/28/2020 >60.0 >60 mL/min/1.73 m^2 Final     eGFR if non    Date Value Ref Range Status   03/28/2020 >60.0 >60 mL/min/1.73 m^2 Final     Comment:     Calculation used to obtain the estimated glomerular filtration  rate (eGFR) is the CKD-EPI equation.            Assessment:   (1) 42 y.o. male with diagnosis of  early stage Small Lymphocytic NHL.  Lymphadenopathy with out anemia, thrombocytopenia or current B sx.    (2)Observe for now.  RTC 3 weeks.

## 2022-11-06 ENCOUNTER — TELEPHONE (OUTPATIENT)
Dept: HEMATOLOGY/ONCOLOGY | Facility: CLINIC | Age: 42
End: 2022-11-06

## 2022-11-08 DIAGNOSIS — C83.00 SMALL LYMPHOCYTIC LYMPHOMA: Primary | ICD-10-CM

## 2022-11-08 DIAGNOSIS — C83.01 SMALL CELL B-CELL LYMPHOMA, LYMPH NODES OF HEAD, FACE, AND NECK: ICD-10-CM

## 2022-11-09 NOTE — TELEPHONE ENCOUNTER
Notified pt that pet scan was ordered and to call us to schedule appt after pet was scheduled. Verbalized understanding.

## 2023-01-04 ENCOUNTER — HOSPITAL ENCOUNTER (OUTPATIENT)
Dept: RADIOLOGY | Facility: HOSPITAL | Age: 43
Discharge: HOME | End: 2023-01-04
Attending: INTERNAL MEDICINE
Payer: COMMERCIAL

## 2023-01-04 VITALS — HEIGHT: 70 IN | WEIGHT: 183 LBS | BODY MASS INDEX: 26.2 KG/M2

## 2023-01-04 DIAGNOSIS — C83.01 SMALL CELL B-CELL LYMPHOMA, LYMPH NODES OF HEAD, FACE, AND NECK: ICD-10-CM

## 2023-01-04 LAB — GLUCOSE SERPL-MCNC: 104 MG/DL (ref 70–110)

## 2023-01-04 PROCEDURE — A9552 F18 FDG: HCPCS | Mod: PO

## 2023-01-04 PROCEDURE — 78815 PET IMAGE W/CT SKULL-THIGH: CPT | Mod: TC,PO

## 2023-01-21 ENCOUNTER — TELEPHONE (OUTPATIENT)
Dept: HEMATOLOGY/ONCOLOGY | Facility: CLINIC | Age: 43
End: 2023-01-21

## 2023-01-26 ENCOUNTER — OFFICE VISIT (OUTPATIENT)
Dept: HEMATOLOGY/ONCOLOGY | Facility: CLINIC | Age: 43
End: 2023-01-26
Payer: COMMERCIAL

## 2023-01-26 VITALS
HEART RATE: 85 BPM | HEIGHT: 70 IN | RESPIRATION RATE: 18 BRPM | TEMPERATURE: 98 F | BODY MASS INDEX: 26.26 KG/M2 | DIASTOLIC BLOOD PRESSURE: 71 MMHG | SYSTOLIC BLOOD PRESSURE: 116 MMHG

## 2023-01-26 DIAGNOSIS — C83.00 SMALL LYMPHOCYTIC LYMPHOMA: Primary | ICD-10-CM

## 2023-01-26 PROCEDURE — 3078F PR MOST RECENT DIASTOLIC BLOOD PRESSURE < 80 MM HG: ICD-10-PCS | Mod: CPTII,S$GLB,, | Performed by: INTERNAL MEDICINE

## 2023-01-26 PROCEDURE — 1159F MED LIST DOCD IN RCRD: CPT | Mod: CPTII,S$GLB,, | Performed by: INTERNAL MEDICINE

## 2023-01-26 PROCEDURE — 99214 OFFICE O/P EST MOD 30 MIN: CPT | Mod: S$GLB,,, | Performed by: INTERNAL MEDICINE

## 2023-01-26 PROCEDURE — 3008F PR BODY MASS INDEX (BMI) DOCUMENTED: ICD-10-PCS | Mod: CPTII,S$GLB,, | Performed by: INTERNAL MEDICINE

## 2023-01-26 PROCEDURE — 3074F PR MOST RECENT SYSTOLIC BLOOD PRESSURE < 130 MM HG: ICD-10-PCS | Mod: CPTII,S$GLB,, | Performed by: INTERNAL MEDICINE

## 2023-01-26 PROCEDURE — 99214 PR OFFICE/OUTPT VISIT, EST, LEVL IV, 30-39 MIN: ICD-10-PCS | Mod: S$GLB,,, | Performed by: INTERNAL MEDICINE

## 2023-01-26 PROCEDURE — 3074F SYST BP LT 130 MM HG: CPT | Mod: CPTII,S$GLB,, | Performed by: INTERNAL MEDICINE

## 2023-01-26 PROCEDURE — 1159F PR MEDICATION LIST DOCUMENTED IN MEDICAL RECORD: ICD-10-PCS | Mod: CPTII,S$GLB,, | Performed by: INTERNAL MEDICINE

## 2023-01-26 PROCEDURE — 3078F DIAST BP <80 MM HG: CPT | Mod: CPTII,S$GLB,, | Performed by: INTERNAL MEDICINE

## 2023-01-26 PROCEDURE — 3008F BODY MASS INDEX DOCD: CPT | Mod: CPTII,S$GLB,, | Performed by: INTERNAL MEDICINE

## 2023-02-03 ENCOUNTER — TELEPHONE (OUTPATIENT)
Dept: HEMATOLOGY/ONCOLOGY | Facility: CLINIC | Age: 43
End: 2023-02-03

## 2023-02-04 NOTE — PROGRESS NOTES
"[facility] Hematology Oncology In Office Subsequent  Encounter Note    1/26/23    Subjective:      Patient ID:   Zachary Riley Jr.  42 y.o. male  1980  Dr. Solo      Chief Complaint:   NHL    HPI:  42 y.o. male with Small lymphocytic lymphoma diagnosed in 2019.  No B sx, he has hx of L & R neck lymphadenopathy,  CBC NL.  He has been followed with observation.  Appetite fine  Weight 183 to 187#.  Hx of fever and night sweats 3/2019, not presently.    He has abdominal pain after eating, cramping.  BM semi formed.    No surgery.    Smoke no, ETOH socially, Allergy no. 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    Covid 4/2020, [facility] bilateral pneumonia.    No meds.    Mom, Dad, Br, Sist, 2 daugh and 1 son all A & W    ROS:   GEN: normal without any fever, night sweats or weight loss  HEENT: See HPi  CV: normal with no CP, SOB, PND, CLAYTON or orthopnea  PULM: normal with no SOB, cough, hemoptysis, sputum or pleuritic pain  GI: See HPI  : normal with no hematuria, dysuria  BREAST: normal with no mass, discharge, pain  SKIN: normal with no rash, erythema, bruising, or swelling     No past medical history on file.  Past Surgical History:   Procedure Laterality Date    denies         Review of patient's allergies indicates:  No Known Allergies  Social History     Socioeconomic History    Marital status: Single   Tobacco Use    Smoking status: Never    Smokeless tobacco: Never   Substance and Sexual Activity    Alcohol use: Yes    Drug use: Never    Sexual activity: Yes         Current Outpatient Medications:     acyclovir (ZOVIRAX) 800 MG Tab, Take 1 tablet (800 mg total) by mouth 3 (three) times daily. for 5 days, Disp: 15 tablet, Rfl: 3          Objective:   Vitals:  Blood pressure 116/71, pulse 85, temperature 98.1 °F (36.7 °C), resp. rate 18, height 5' 10" (1.778 m), weight (P) 82.6 kg (182 lb).    Physical Examination:   GEN: no apparent distress, comfortable  HEAD: atraumatic and normocephalic  EYES: no pallor, no icterus  ENT:  " no pharyngeal erythema, external ears WNL; no nasal discharge  NECK: no masses, thyroid normal, trachea midline, no LAD/LN's, supple  CV: RRR with no murmur; normal pulse; normal S1 and S2; no pedal edema  CHEST: Normal respiratory effort; CTAB; normal breath sounds; no wheeze or crackles  ABDOM: nontender and nondistended; soft; no rebound/guarding, L/S NP  MUSC/Skeletal: ROM normal; no crepitus; joints normal  EXTREM: no clubbing, cyanosis, inflammation or swelling  SKIN: no rashes, lesions, ulcers, petechiae   : no cvat  NEURO: grossly intact; motor/sensory WNL; no tremors  PSYCH: normal mood, affect and behavior  LYMPH: normal supraclavicular, axillary and groin LN's  Shotty LN palpable at L and R neck.      Labs:   Lab Results   Component Value Date    WBC 4.58 03/28/2020    HGB 14.4 03/28/2020    HCT 43.6 03/28/2020    MCV 89 03/28/2020     03/28/2020    CMP  Sodium   Date Value Ref Range Status   03/28/2020 138 136 - 145 mmol/L Final     Potassium   Date Value Ref Range Status   03/28/2020 3.8 3.5 - 5.1 mmol/L Final     Chloride   Date Value Ref Range Status   03/28/2020 96 95 - 110 mmol/L Final     CO2   Date Value Ref Range Status   03/28/2020 31 (H) 23 - 29 mmol/L Final     Glucose   Date Value Ref Range Status   03/28/2020 104 70 - 110 mg/dL Final     BUN   Date Value Ref Range Status   03/28/2020 16 6 - 20 mg/dL Final     Creatinine   Date Value Ref Range Status   03/28/2020 1.2 0.5 - 1.4 mg/dL Final     Calcium   Date Value Ref Range Status   03/28/2020 8.9 8.7 - 10.5 mg/dL Final     Total Protein   Date Value Ref Range Status   03/28/2020 7.3 6.0 - 8.4 g/dL Final     Albumin   Date Value Ref Range Status   03/28/2020 3.7 3.5 - 5.2 g/dL Final     Total Bilirubin   Date Value Ref Range Status   03/28/2020 0.7 0.1 - 1.0 mg/dL Final     Comment:     For infants and newborns, interpretation of results should be based  on gestational age, weight and in agreement with  clinical  observations.  Premature Infant recommended reference ranges:  Up to 24 hours.............<8.0 mg/dL  Up to 48 hours............<12.0 mg/dL  3-5 days..................<15.0 mg/dL  6-29 days.................<15.0 mg/dL       Alkaline Phosphatase   Date Value Ref Range Status   03/28/2020 35 (L) 55 - 135 U/L Final     AST   Date Value Ref Range Status   03/28/2020 27 10 - 40 U/L Final     ALT   Date Value Ref Range Status   03/28/2020 23 10 - 44 U/L Final     Anion Gap   Date Value Ref Range Status   03/28/2020 11 8 - 16 mmol/L Final     eGFR if    Date Value Ref Range Status   03/28/2020 >60.0 >60 mL/min/1.73 m^2 Final     eGFR if non    Date Value Ref Range Status   03/28/2020 >60.0 >60 mL/min/1.73 m^2 Final     Comment:     Calculation used to obtain the estimated glomerular filtration  rate (eGFR) is the CKD-EPI equation.        PET 1/4/23 generalized lymphadenopathy, appears stable per pt's discussion.    Assessment:   (1) 42 y.o. male with diagnosis of  early stage Small Lymphocytic NHL.  Lymphadenopathy with out anemia, thrombocytopenia or current B sx.    (2)Observe for now.  RTC 6 months.    Help get him a PMD.

## 2023-02-06 ENCOUNTER — TELEPHONE (OUTPATIENT)
Dept: HEMATOLOGY/ONCOLOGY | Facility: CLINIC | Age: 43
End: 2023-02-06

## 2023-02-06 NOTE — PROGRESS NOTES
Dr. Dennis requested me to assist patient in locating a PCP.  Voicemail left with information and my phone number should he need additional assistance.

## 2023-02-27 ENCOUNTER — OFFICE VISIT (OUTPATIENT)
Dept: URGENT CARE | Facility: CLINIC | Age: 43
End: 2023-02-27
Payer: COMMERCIAL

## 2023-02-27 VITALS
OXYGEN SATURATION: 99 % | DIASTOLIC BLOOD PRESSURE: 74 MMHG | SYSTOLIC BLOOD PRESSURE: 116 MMHG | HEIGHT: 70 IN | BODY MASS INDEX: 26.2 KG/M2 | WEIGHT: 183 LBS | RESPIRATION RATE: 18 BRPM | HEART RATE: 61 BPM | TEMPERATURE: 98 F

## 2023-02-27 DIAGNOSIS — Z48.02 REMOVAL OF STAPLE: Primary | ICD-10-CM

## 2023-02-27 PROBLEM — C91.10 CLL (CHRONIC LYMPHOCYTIC LEUKEMIA): Status: ACTIVE | Noted: 2022-03-10

## 2023-02-27 PROBLEM — R59.1 LYMPHADENOPATHY: Status: ACTIVE | Noted: 2022-03-10

## 2023-02-27 PROCEDURE — 1159F MED LIST DOCD IN RCRD: CPT | Mod: CPTII,S$GLB,,

## 2023-02-27 PROCEDURE — 99203 PR OFFICE/OUTPT VISIT, NEW, LEVL III, 30-44 MIN: ICD-10-PCS | Mod: S$GLB,,,

## 2023-02-27 PROCEDURE — 3008F BODY MASS INDEX DOCD: CPT | Mod: CPTII,S$GLB,,

## 2023-02-27 PROCEDURE — 3078F PR MOST RECENT DIASTOLIC BLOOD PRESSURE < 80 MM HG: ICD-10-PCS | Mod: CPTII,S$GLB,,

## 2023-02-27 PROCEDURE — 99024 POSTOP FOLLOW-UP VISIT: CPT | Mod: S$GLB,,,

## 2023-02-27 PROCEDURE — 3078F DIAST BP <80 MM HG: CPT | Mod: CPTII,S$GLB,,

## 2023-02-27 PROCEDURE — 3074F PR MOST RECENT SYSTOLIC BLOOD PRESSURE < 130 MM HG: ICD-10-PCS | Mod: CPTII,S$GLB,,

## 2023-02-27 PROCEDURE — 99203 OFFICE O/P NEW LOW 30 MIN: CPT | Mod: S$GLB,,,

## 2023-02-27 PROCEDURE — 1160F PR REVIEW ALL MEDS BY PRESCRIBER/CLIN PHARMACIST DOCUMENTED: ICD-10-PCS | Mod: CPTII,S$GLB,,

## 2023-02-27 PROCEDURE — 99024 STAPLE REMOVAL: ICD-10-PCS | Mod: S$GLB,,,

## 2023-02-27 PROCEDURE — 1159F PR MEDICATION LIST DOCUMENTED IN MEDICAL RECORD: ICD-10-PCS | Mod: CPTII,S$GLB,,

## 2023-02-27 PROCEDURE — 3008F PR BODY MASS INDEX (BMI) DOCUMENTED: ICD-10-PCS | Mod: CPTII,S$GLB,,

## 2023-02-27 PROCEDURE — 1160F RVW MEDS BY RX/DR IN RCRD: CPT | Mod: CPTII,S$GLB,,

## 2023-02-27 PROCEDURE — 3074F SYST BP LT 130 MM HG: CPT | Mod: CPTII,S$GLB,,

## 2023-02-27 NOTE — PROCEDURES
Staple Removal    Date/Time: 2/27/2023 2:00 PM  Location procedure was performed: [facility]  Performed by: Ruben Madden PA-C  Authorized by: Ruben Madden PA-C   Assisting provider: Ruben Madden PA-C  Pre-operative diagnosis: scalp laceration  Post-operative diagnosis: scalp laceration  Body area: head/neck  Location details: scalp  Description of findings: 8 staples placed on the scalp, laceration healed adequately   Wound Appearance: clean  Staples Removed: 9 (8 on the scalp, 1 on the back)  Post-removal: no dressing applied  Complications: No  Estimated blood loss (mL): 0  Specimens: No  Implants: No  Patient tolerance: Patient tolerated the procedure well with no immediate complications

## 2023-02-27 NOTE — PROGRESS NOTES
"Subjective:       Patient ID: Zachary Riley Jr. is a 42 y.o. male.    Vitals:  height is 5' 10" (1.778 m) and weight is 83 kg (183 lb). His oral temperature is 98.4 °F (36.9 °C). His blood pressure is 116/74 and his pulse is 61. His respiration is 18 and oxygen saturation is 99%.     Chief Complaint: Suture / Staple Removal    Patient being seen for staple removal place in 2/18/2023 from [facility]. Denies having any pain or drainage. Patient denies any fever, chills.    Suture / Staple Removal  The sutures were placed 7 to 10 days ago. He tried oral antibiotics since the wound repair. The treatment provided significant relief. His temperature was unmeasured prior to arrival. There has been no drainage from the wound. There is no redness present. There is no swelling present. There is no pain present.     Constitution: Negative for chills and fever.   Cardiovascular:  Negative for chest pain and sob on exertion.   Skin:  Negative for skin thickening/induration, erythema and abscess.     Objective:      Physical Exam   Constitutional:  Non-toxic appearance. He does not appear ill. No distress. normal  HENT:   Head: Normocephalic.   Abdominal: Normal appearance.   Neurological: He is alert.   Skin: Skin is not diaphoretic. No erythema   Nursing note and vitals reviewed.      Assessment:       1. Removal of staple          Plan:     Previous notes reviewed.  Vital signs reviewed. Within normal limits.  Discussed staple removal, home care, tx options, and given follow up precautions.  Patient was briefed on my thought process and diagnosis.   Patient involved with the treatment plan and agreed to the plan.  Patient informed on warning signs, patient understood warning signs and to go to urgent care or ER if warning signs appear.    Patient Instructions   Please return here or go to the Emergency Department for any concerns or worsening of condition.    Please follow up with your primary care doctor or specialist as " needed.    If you  smoke, please stop smoking.    Wound may ooze watery or bloody fluid but this should resolve  Continue to keep clean and dry, may cleanse daily and as needed with antibacterial soap such as Dial antibacterial or hibiclens  May continue to apply topical antibiotic ointment as needed  Follow up with provider if no improvement seen in 24-48 hours, go to ER for worsening of symptoms of infection:  increases redness, swelling or pain, fever, chills, purulent drainage    Removal of staple  -     Staple Removal      Ruben Madden PA-C

## 2023-02-27 NOTE — PATIENT INSTRUCTIONS
Please return here or go to the Emergency Department for any concerns or worsening of condition.    Please follow up with your primary care doctor or specialist as needed.    If you  smoke, please stop smoking.    Wound may ooze watery or bloody fluid but this should resolve  Continue to keep clean and dry, may cleanse daily and as needed with antibacterial soap such as Dial antibacterial or hibiclens  May continue to apply topical antibiotic ointment as needed  Follow up with provider if no improvement seen in 24-48 hours, go to ER for worsening of symptoms of infection:  increases redness, swelling or pain, fever, chills, purulent drainage

## 2023-09-27 ENCOUNTER — TELEPHONE (OUTPATIENT)
Dept: HEMATOLOGY/ONCOLOGY | Facility: CLINIC | Age: 43
End: 2023-09-27

## 2023-09-27 DIAGNOSIS — C83.01 SMALL CELL B-CELL LYMPHOMA, LYMPH NODES OF HEAD, FACE, AND NECK: ICD-10-CM

## 2023-09-27 DIAGNOSIS — C83.00 SMALL LYMPHOCYTIC LYMPHOMA: Primary | ICD-10-CM

## 2023-10-19 ENCOUNTER — HOSPITAL ENCOUNTER (OUTPATIENT)
Dept: RADIOLOGY | Facility: HOSPITAL | Age: 43
Discharge: HOME | End: 2023-10-19
Attending: INTERNAL MEDICINE
Payer: COMMERCIAL

## 2023-10-19 VITALS — HEIGHT: 70 IN | WEIGHT: 183 LBS | BODY MASS INDEX: 26.2 KG/M2

## 2023-10-19 DIAGNOSIS — C83.01 SMALL CELL B-CELL LYMPHOMA, LYMPH NODES OF HEAD, FACE, AND NECK: ICD-10-CM

## 2023-10-19 LAB — GLUCOSE SERPL-MCNC: 102 MG/DL (ref 70–110)

## 2023-10-19 PROCEDURE — A9552 F18 FDG: HCPCS | Mod: PO

## 2023-11-10 ENCOUNTER — OFFICE VISIT (OUTPATIENT)
Dept: HEMATOLOGY/ONCOLOGY | Facility: CLINIC | Age: 43
End: 2023-11-10
Payer: COMMERCIAL

## 2023-11-10 VITALS
RESPIRATION RATE: 18 BRPM | DIASTOLIC BLOOD PRESSURE: 73 MMHG | HEART RATE: 103 BPM | SYSTOLIC BLOOD PRESSURE: 123 MMHG | BODY MASS INDEX: 24.88 KG/M2 | HEIGHT: 70 IN | TEMPERATURE: 98 F | WEIGHT: 173.81 LBS

## 2023-11-10 DIAGNOSIS — C83.00 SMALL LYMPHOCYTIC LYMPHOMA: Primary | ICD-10-CM

## 2023-11-10 PROCEDURE — 99214 OFFICE O/P EST MOD 30 MIN: CPT | Mod: S$GLB,,, | Performed by: INTERNAL MEDICINE

## 2023-11-10 PROCEDURE — 1159F PR MEDICATION LIST DOCUMENTED IN MEDICAL RECORD: ICD-10-PCS | Mod: CPTII,S$GLB,, | Performed by: INTERNAL MEDICINE

## 2023-11-10 PROCEDURE — 3074F SYST BP LT 130 MM HG: CPT | Mod: CPTII,S$GLB,, | Performed by: INTERNAL MEDICINE

## 2023-11-10 PROCEDURE — 3074F PR MOST RECENT SYSTOLIC BLOOD PRESSURE < 130 MM HG: ICD-10-PCS | Mod: CPTII,S$GLB,, | Performed by: INTERNAL MEDICINE

## 2023-11-10 PROCEDURE — 3078F DIAST BP <80 MM HG: CPT | Mod: CPTII,S$GLB,, | Performed by: INTERNAL MEDICINE

## 2023-11-10 PROCEDURE — 3078F PR MOST RECENT DIASTOLIC BLOOD PRESSURE < 80 MM HG: ICD-10-PCS | Mod: CPTII,S$GLB,, | Performed by: INTERNAL MEDICINE

## 2023-11-10 PROCEDURE — 99214 PR OFFICE/OUTPT VISIT, EST, LEVL IV, 30-39 MIN: ICD-10-PCS | Mod: S$GLB,,, | Performed by: INTERNAL MEDICINE

## 2023-11-10 PROCEDURE — 1159F MED LIST DOCD IN RCRD: CPT | Mod: CPTII,S$GLB,, | Performed by: INTERNAL MEDICINE

## 2023-11-10 PROCEDURE — 3008F BODY MASS INDEX DOCD: CPT | Mod: CPTII,S$GLB,, | Performed by: INTERNAL MEDICINE

## 2023-11-10 PROCEDURE — 3008F PR BODY MASS INDEX (BMI) DOCUMENTED: ICD-10-PCS | Mod: CPTII,S$GLB,, | Performed by: INTERNAL MEDICINE

## 2023-11-11 ENCOUNTER — TELEPHONE (OUTPATIENT)
Dept: HEMATOLOGY/ONCOLOGY | Facility: CLINIC | Age: 43
End: 2023-11-11

## 2023-11-11 NOTE — PROGRESS NOTES
"[facility] Hematology Oncology In Office Subsequent  Encounter Note    11/10/23    Subjective:      Patient ID:   Zachary Riley Jr.  43 y.o. male  1980  Dr. Solo      Chief Complaint:   NHL    HPI:  43 y.o. male with Small lymphocytic lymphoma diagnosed in 2019.  No B sx, he has hx of L & R neck lymphadenopathy,  CBC NL.  He has been followed with observation.  Appetite fine  Weight 183 to 187#.  Hx of fever and night sweats 3/2019, not presently.    He denies "B" sx of loss of appetite, loss of weight, night sweats, fevers.    He has begun to take soursop qod for several months.    No surgery.    Smoke no, ETOH socially, Allergy no. 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    Covid 4/2020, [facility] bilateral pneumonia.    No meds.    Mom, Dad, Br, Sist, 2 daugh and 1 son all A & W    ROS:   GEN: normal without any fever, night sweats or weight loss  HEENT: See HPi  CV: normal with no CP, SOB, PND, CLAYTON or orthopnea  PULM: normal with no SOB, cough, hemoptysis, sputum or pleuritic pain  GI: See HPI  : normal with no hematuria, dysuria  BREAST: normal with no mass, discharge, pain  SKIN: normal with no rash, erythema, bruising, or swelling     No past medical history on file.  Past Surgical History:   Procedure Laterality Date    denies         Review of patient's allergies indicates:  No Known Allergies  Social History     Socioeconomic History    Marital status: Single   Tobacco Use    Smoking status: Never    Smokeless tobacco: Never   Substance and Sexual Activity    Alcohol use: Yes    Drug use: Never    Sexual activity: Yes         Current Outpatient Medications:     acyclovir (ZOVIRAX) 800 MG Tab, Take 1 tablet (800 mg total) by mouth 3 (three) times daily. for 5 days, Disp: 15 tablet, Rfl: 3          Objective:   Vitals:  Blood pressure 123/73, pulse 103, temperature 97.6 °F (36.4 °C), resp. rate 18, height 5' 10" (1.778 m), weight 78.8 kg (173 lb 12.8 oz).    Physical Examination:   GEN: no apparent distress, " comfortable  HEAD: atraumatic and normocephalic  EYES: no pallor, no icterus  ENT:  no pharyngeal erythema, external ears WNL; no nasal discharge  NECK: no masses, thyroid normal, trachea midline, no LAD/LN's, supple  CV: RRR with no murmur; normal pulse; normal S1 and S2; no pedal edema  CHEST: Normal respiratory effort; CTAB; normal breath sounds; no wheeze or crackles  ABDOM: nontender and nondistended; soft; no rebound/guarding, L/S NP  MUSC/Skeletal: ROM normal; no crepitus; joints normal  EXTREM: no clubbing, cyanosis, inflammation or swelling  SKIN: no rashes, lesions, ulcers, petechiae   : no cvat  NEURO: grossly intact; motor/sensory WNL; no tremors  PSYCH: normal mood, affect and behavior  LYMPH: normal supraclavicular, axillary and groin LN's  Shotty LN palpable at L and R neck.      Labs:   Lab Results   Component Value Date    WBC 10.6 06/18/2020    HGB 15.0 06/18/2020    HCT 42.8 06/18/2020    MCV 90.2 06/18/2020     03/28/2020    CMP  Sodium   Date Value Ref Range Status   02/18/2023 141 135 - 146 mmol/L Final   03/28/2020 138 136 - 145 mmol/L Final     Potassium   Date Value Ref Range Status   02/18/2023 3.8 3.6 - 5.2 mmol/L Final   03/28/2020 3.8 3.5 - 5.1 mmol/L Final     Chloride   Date Value Ref Range Status   03/28/2020 96 95 - 110 mmol/L Final     CO2   Date Value Ref Range Status   03/28/2020 31 (H) 23 - 29 mmol/L Final     Carbon Dioxide   Date Value Ref Range Status   02/18/2023 26 24 - 32 mmol/L Final     Glucose   Date Value Ref Range Status   03/28/2020 104 70 - 110 mg/dL Final     BUN   Date Value Ref Range Status   02/18/2023 9.0 7.0 - 25.0 mg/dL Final   03/28/2020 16 6 - 20 mg/dL Final     Creatinine   Date Value Ref Range Status   02/18/2023 1.02 0.70 - 1.40 mg/dL Final   03/28/2020 1.2 0.5 - 1.4 mg/dL Final     Calcium   Date Value Ref Range Status   02/18/2023 9.0 8.4 - 10.3 mg/dL Final   03/28/2020 8.9 8.7 - 10.5 mg/dL Final     Total Protein   Date Value Ref Range Status    03/28/2020 7.3 6.0 - 8.4 g/dL Final     Albumin   Date Value Ref Range Status   02/18/2023 4.2 3.4 - 5.0 g/dL Final   03/28/2020 3.7 3.5 - 5.2 g/dL Final     Total Bilirubin   Date Value Ref Range Status   02/18/2023 0.8 <1.3 mg/dL Final   03/28/2020 0.7 0.1 - 1.0 mg/dL Final     Comment:     For infants and newborns, interpretation of results should be based  on gestational age, weight and in agreement with clinical  observations.  Premature Infant recommended reference ranges:  Up to 24 hours.............<8.0 mg/dL  Up to 48 hours............<12.0 mg/dL  3-5 days..................<15.0 mg/dL  6-29 days.................<15.0 mg/dL       Alkaline Phosphatase   Date Value Ref Range Status   03/28/2020 35 (L) 55 - 135 U/L Final     AST   Date Value Ref Range Status   02/18/2023 22 <45 U/L Final   03/28/2020 27 10 - 40 U/L Final     ALT   Date Value Ref Range Status   02/18/2023 18 <46 U/L Final   03/28/2020 23 10 - 44 U/L Final     Anion Gap   Date Value Ref Range Status   03/28/2020 11 8 - 16 mmol/L Final     eGFR if    Date Value Ref Range Status   03/28/2020 >60.0 >60 mL/min/1.73 m^2 Final     eGFR    Date Value Ref Range Status   02/18/2023 95 >=90 mL/min Final     Comment:     Calculation based on the Chronic Kidney Disease Epidemiology Collaboration (CKD-EPI) equation refit without adjustment for race.     eGFR if non    Date Value Ref Range Status   03/28/2020 >60.0 >60 mL/min/1.73 m^2 Final     Comment:     Calculation used to obtain the estimated glomerular filtration  rate (eGFR) is the CKD-EPI equation.        PET 1/4/23 generalized lymphadenopathy, appears stable per pt's discussion.    PET 10/19/23 generalized lymphadenopathy, areas have decreased by up to 25%.  Stable to improved.  Assessment:   (1) 43 y.o. male with diagnosis of  early stage Small Lymphocytic NHL.  Lymphadenopathy with out anemia, thrombocytopenia or current B sx.    (2)Observe for now.   RTC 6 months.  Can cancel.  RTC 1 year, repeat PET 1 year.    Help get him a PMD.  Ask SW to help.

## 2023-11-15 NOTE — TELEPHONE ENCOUNTER
I called pt to assist him; my call was sent to voicemail.  I left a voicemail with the number to [facility] [phone] or [facility] [phone].

## 2024-11-05 ENCOUNTER — TELEPHONE (OUTPATIENT)
Facility: CLINIC | Age: 44
End: 2024-11-05
Payer: COMMERCIAL

## 2025-03-28 ENCOUNTER — TELEPHONE (OUTPATIENT)
Facility: CLINIC | Age: 45
End: 2025-03-28

## 2025-03-28 NOTE — TELEPHONE ENCOUNTER
I left voice message for pt regarding confirming appt w/  Dr. Dennis on 4/7/2025. Left number for pt to contact the office, if they have any questions, would like to confirm or would like to reschedule appt

## 2025-04-02 ENCOUNTER — HOSPITAL ENCOUNTER (EMERGENCY)
Facility: OTHER | Age: 45
Discharge: HOME | End: 2025-04-03
Payer: COMMERCIAL

## 2025-04-02 DIAGNOSIS — F22 PARANOIA: Primary | ICD-10-CM

## 2025-04-02 LAB
ABSOLUTE EOSINOPHIL (OHS): 0.05 K/UL
ABSOLUTE MONOCYTE (OHS): 0.39 K/UL (ref 0.3–1)
ABSOLUTE NEUTROPHIL COUNT (OHS): 2.57 K/UL (ref 1.8–7.7)
BASOPHILS # BLD AUTO: 0.02 K/UL
BASOPHILS NFR BLD AUTO: 0.3 %
ERYTHROCYTE [DISTWIDTH] IN BLOOD BY AUTOMATED COUNT: 13 % (ref 11.5–14.5)
HCT VFR BLD AUTO: 39.7 % (ref 40–54)
HGB BLD-MCNC: 13.3 GM/DL (ref 14–18)
IMM GRANULOCYTES # BLD AUTO: 0.01 K/UL (ref 0–0.04)
IMM GRANULOCYTES NFR BLD AUTO: 0.2 % (ref 0–0.5)
LYMPHOCYTES # BLD AUTO: 2.84 K/UL (ref 1–4.8)
MCH RBC QN AUTO: 30 PG (ref 27–31)
MCHC RBC AUTO-ENTMCNC: 33.5 G/DL (ref 32–36)
MCV RBC AUTO: 89 FL (ref 82–98)
NUCLEATED RBC (/100WBC) (OHS): 0 /100 WBC
PLATELET # BLD AUTO: 164 K/UL (ref 150–450)
PMV BLD AUTO: 9 FL (ref 9.2–12.9)
RBC # BLD AUTO: 4.44 M/UL (ref 4.6–6.2)
RELATIVE EOSINOPHIL (OHS): 0.9 %
RELATIVE LYMPHOCYTE (OHS): 48.3 % (ref 18–48)
RELATIVE MONOCYTE (OHS): 6.6 % (ref 4–15)
RELATIVE NEUTROPHIL (OHS): 43.7 % (ref 38–73)
WBC # BLD AUTO: 5.88 K/UL (ref 3.9–12.7)

## 2025-04-02 PROCEDURE — 85025 COMPLETE CBC W/AUTO DIFF WBC: CPT

## 2025-04-02 PROCEDURE — 82077 ASSAY SPEC XCP UR&BREATH IA: CPT

## 2025-04-02 PROCEDURE — 81003 URINALYSIS AUTO W/O SCOPE: CPT | Mod: 59

## 2025-04-02 PROCEDURE — 80143 DRUG ASSAY ACETAMINOPHEN: CPT

## 2025-04-02 PROCEDURE — 99283 EMERGENCY DEPT VISIT LOW MDM: CPT

## 2025-04-02 PROCEDURE — 80307 DRUG TEST PRSMV CHEM ANLYZR: CPT

## 2025-04-02 PROCEDURE — 80053 COMPREHEN METABOLIC PANEL: CPT

## 2025-04-03 VITALS
OXYGEN SATURATION: 99 % | HEIGHT: 70 IN | WEIGHT: 173 LBS | TEMPERATURE: 98 F | DIASTOLIC BLOOD PRESSURE: 76 MMHG | BODY MASS INDEX: 24.77 KG/M2 | RESPIRATION RATE: 18 BRPM | SYSTOLIC BLOOD PRESSURE: 138 MMHG | HEART RATE: 74 BPM

## 2025-04-03 LAB
ALBUMIN SERPL BCP-MCNC: 4.2 G/DL (ref 3.5–5.2)
ALP SERPL-CCNC: 45 UNIT/L (ref 40–150)
ALT SERPL W/O P-5'-P-CCNC: 30 UNIT/L (ref 10–44)
AMPHET UR QL SCN: NEGATIVE
ANION GAP (OHS): 7 MMOL/L (ref 8–16)
APAP SERPL-MCNC: <3 UG/ML (ref 10–20)
AST SERPL-CCNC: 24 UNIT/L (ref 11–45)
BARBITURATE SCN PRESENT UR: NEGATIVE
BENZODIAZ UR QL SCN: NEGATIVE
BILIRUB SERPL-MCNC: 0.4 MG/DL (ref 0.1–1)
BILIRUB UR QL STRIP.AUTO: NEGATIVE
BUN SERPL-MCNC: 21 MG/DL (ref 6–20)
CALCIUM SERPL-MCNC: 8.8 MG/DL (ref 8.7–10.5)
CANNABINOIDS UR QL SCN: NEGATIVE
CHLORIDE SERPL-SCNC: 108 MMOL/L (ref 95–110)
CLARITY UR: CLEAR
CO2 SERPL-SCNC: 24 MMOL/L (ref 23–29)
COCAINE UR QL SCN: NEGATIVE
COLOR UR AUTO: YELLOW
CREAT SERPL-MCNC: 1.2 MG/DL (ref 0.5–1.4)
CREAT UR-MCNC: 200 MG/DL (ref 23–375)
ETHANOL SERPL-MCNC: <10 MG/DL
GFR SERPLBLD CREATININE-BSD FMLA CKD-EPI: >60 ML/MIN/1.73/M2
GLUCOSE SERPL-MCNC: 102 MG/DL (ref 70–110)
GLUCOSE UR QL STRIP: NEGATIVE
HGB UR QL STRIP: NEGATIVE
KETONES UR QL STRIP: NEGATIVE
LEUKOCYTE ESTERASE UR QL STRIP: NEGATIVE
METHADONE UR QL SCN: NEGATIVE
NITRITE UR QL STRIP: NEGATIVE
OPIATES UR QL SCN: NEGATIVE
PCP UR QL: NEGATIVE
PH UR STRIP: 6 [PH]
POTASSIUM SERPL-SCNC: 3.9 MMOL/L (ref 3.5–5.1)
PROT SERPL-MCNC: 7 GM/DL (ref 6–8.4)
PROT UR QL STRIP: ABNORMAL
SODIUM SERPL-SCNC: 139 MMOL/L (ref 136–145)
SP GR UR STRIP: 1.03
UROBILINOGEN UR STRIP-ACNC: ABNORMAL EU/DL

## 2025-04-03 PROCEDURE — G0426 INPT/ED TELECONSULT50: HCPCS | Mod: GT,,, | Performed by: PSYCHIATRY & NEUROLOGY

## 2025-04-03 NOTE — ED NOTES
"Pt belongings:  1 pair of black and red slides  1 pair of orange shorts  1 white t shirt  [facility]    Pt valuables #[id number]  1 pair of casper hoop earrings in labeled specimen cup  1 black eyeliner  1 black anker portable   1 blue iphone with cracks on back  1 gold key  1 receipt "196.00"    Pt belongings and valuables given to security  "
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
## 2025-04-15 ENCOUNTER — TELEPHONE (OUTPATIENT)
Facility: CLINIC | Age: 45
End: 2025-04-15
Payer: COMMERCIAL